# Patient Record
Sex: FEMALE | Race: WHITE | NOT HISPANIC OR LATINO | Employment: OTHER | URBAN - METROPOLITAN AREA
[De-identification: names, ages, dates, MRNs, and addresses within clinical notes are randomized per-mention and may not be internally consistent; named-entity substitution may affect disease eponyms.]

---

## 2017-01-06 DIAGNOSIS — M25.561 RIGHT KNEE PAIN, UNSPECIFIED CHRONICITY: Primary | ICD-10-CM

## 2017-01-09 ENCOUNTER — HOSPITAL ENCOUNTER (OUTPATIENT)
Dept: RADIOLOGY | Facility: HOSPITAL | Age: 37
Discharge: HOME | End: 2017-01-09
Attending: ORTHOPAEDIC SURGERY
Payer: MEDICAID

## 2017-01-09 ENCOUNTER — OFFICE VISIT (OUTPATIENT)
Dept: ORTHOPEDICS | Facility: CLINIC | Age: 37
End: 2017-01-09
Payer: MEDICAID

## 2017-01-09 VITALS — BODY MASS INDEX: 31.83 KG/M2 | HEIGHT: 68 IN | WEIGHT: 210 LBS

## 2017-01-09 DIAGNOSIS — M25.561 RIGHT KNEE PAIN, UNSPECIFIED CHRONICITY: ICD-10-CM

## 2017-01-09 DIAGNOSIS — M17.11 PRIMARY OSTEOARTHRITIS OF RIGHT KNEE: Primary | ICD-10-CM

## 2017-01-09 PROCEDURE — 99212 OFFICE O/P EST SF 10 MIN: CPT | Mod: PBBFAC,PO | Performed by: ORTHOPAEDIC SURGERY

## 2017-01-09 PROCEDURE — 73560 X-RAY EXAM OF KNEE 1 OR 2: CPT | Mod: 26,RT,, | Performed by: RADIOLOGY

## 2017-01-09 PROCEDURE — 99999 PR PBB SHADOW E&M-EST. PATIENT-LVL II: CPT | Mod: PBBFAC,,, | Performed by: ORTHOPAEDIC SURGERY

## 2017-01-09 PROCEDURE — 99203 OFFICE O/P NEW LOW 30 MIN: CPT | Mod: S$PBB,,, | Performed by: ORTHOPAEDIC SURGERY

## 2017-01-09 NOTE — PROGRESS NOTES
HISTORY OF PRESENT ILLNESS:  Lennie Amor is 36 years old, complaining of   pain in her right knee. A couple of weeks ago, on Jeannie day, she twisted her   knee, felt a pop, felt that her kneecap came out of place as she has had this   in the past, but has never remained this swollen and painful, rates the pain as   10/10 on the pain scale.    PHYSICAL EXAMINATION:  Today shows she does have swelling about the knee.    Positive patellar apprehension.  Skin is intact.  Compartments are soft.    Extensor mechanism appears to be functioning.    X-rays show well preserved joint spacing.    ASSESSMENT:  Patellar instability.    PLAN:  We will continue with knee immobilizer.  We will get an MRI of her knee.    We will see her back after that to discuss different treatment options.      ABUNDIO/NAVID  dd: 2017 09:48:14 (CST)  td: 2017 19:55:18 (CST)  Doc ID   #[id number]  Job ID #[id number]    CC:     Further History  Aching pain  Worse with activity  Relieved with rest  No other associated symptoms  No other radiation    Further Exam  Alert and oriented  Pleasant  Contralateral limb has appropriate range of motion for age and condition  Contralateral limb has appropriate strength for age and condition  Contralateral limb has appropriate stability  for age and condition  No adenopathy  Pulses are appropriate for current condition  Skin is intact        Chief Complaint    Chief Complaint   Patient presents with    Knee Pain     right knee x2wks       HPI  Lennie Amor is a 36 y.o.  female who presents with       Past Medical History  History reviewed. No pertinent past medical history.    Past Surgical History  Past Surgical History   Procedure Laterality Date    Knee arthroscopy       section         Medications  No current outpatient prescriptions on file.     No current facility-administered medications for this visit.        Allergies  Review of patient's allergies indicates:  No Known  Allergies    Family History  History reviewed. No pertinent family history.    Social History  Social History     Social History    Marital status:      Spouse name: N/A    Number of children: N/A    Years of education: N/A     Occupational History    Not on file.     Social History Main Topics    Smoking status: Not on file    Smokeless tobacco: Not on file    Alcohol use Not on file    Drug use: Not on file    Sexual activity: Not on file     Other Topics Concern    Not on file     Social History Narrative    No narrative on file               Review of Systems     Constitutional: Negative    HENT: Negative  Eyes: Negative  Respiratory: Negative  Cardiovascular: Negative  Musculoskeletal: HPI  Skin: Negative  Neurological: Negative  Hematological: Negative  Endocrine: Negative                 Physical Exam    There were no vitals filed for this visit.  Body mass index is 31.93 kg/(m^2).  Physical Examination:     General appearance -  well appearing, and in no distress  Mental status - awake  Neck - supple  Chest -  symmetric air entry  Heart - normal rate   Abdomen - soft      Assessment     1. Primary osteoarthritis of right knee    2. Right knee pain, unspecified chronicity          Plan

## 2017-01-16 ENCOUNTER — OFFICE VISIT (OUTPATIENT)
Dept: ORTHOPEDICS | Facility: CLINIC | Age: 37
End: 2017-01-16
Payer: MEDICAID

## 2017-01-16 VITALS — BODY MASS INDEX: 31.83 KG/M2 | HEIGHT: 68 IN | WEIGHT: 210 LBS

## 2017-01-16 DIAGNOSIS — M25.561 RIGHT KNEE PAIN, UNSPECIFIED CHRONICITY: ICD-10-CM

## 2017-01-16 DIAGNOSIS — M17.11 PRIMARY OSTEOARTHRITIS OF RIGHT KNEE: Primary | ICD-10-CM

## 2017-01-16 PROCEDURE — 99212 OFFICE O/P EST SF 10 MIN: CPT | Mod: PBBFAC,PO | Performed by: ORTHOPAEDIC SURGERY

## 2017-01-16 PROCEDURE — 99999 PR PBB SHADOW E&M-EST. PATIENT-LVL II: CPT | Mod: PBBFAC,,, | Performed by: ORTHOPAEDIC SURGERY

## 2017-01-16 PROCEDURE — 99213 OFFICE O/P EST LOW 20 MIN: CPT | Mod: S$PBB,,, | Performed by: ORTHOPAEDIC SURGERY

## 2017-01-16 NOTE — PROGRESS NOTES
HISTORY OF PRESENT ILLNESS:  Followup MRI of her knee showed changes consistent   with her history of patellar instability.  At this point, we are going to   transition her to a Shields type brace.  We will also get her set up with   physical therapy.  We can check her back in a few weeks' time to see how things   are coming along.      ABUNDIO/MARIA ALEJANDRA  dd: 01/16/2017 11:25:59 (CST)  td: 01/17/2017 03:11:31 (CST)  Doc ID   #[id number]  Job ID #[id number]    CC:

## 2017-08-03 RX ORDER — CYCLOBENZAPRINE HCL 10 MG
10 TABLET ORAL NIGHTLY
Qty: 30 TABLET | Refills: 0 | Status: SHIPPED | OUTPATIENT
Start: 2017-08-03 | End: 2017-08-31 | Stop reason: SDUPTHER

## 2017-08-03 RX ORDER — CYCLOBENZAPRINE HCL 10 MG
1 TABLET ORAL NIGHTLY
Refills: 2 | COMMUNITY
Start: 2017-07-01 | End: 2017-08-03 | Stop reason: SDUPTHER

## 2017-08-04 ENCOUNTER — TELEPHONE (OUTPATIENT)
Dept: FAMILY MEDICINE | Facility: CLINIC | Age: 37
End: 2017-08-04

## 2017-08-04 NOTE — TELEPHONE ENCOUNTER
Spoke with pt, notified her that echo was normal and will call back to schedule a follow up in 6 months.

## 2017-08-17 ENCOUNTER — TELEPHONE (OUTPATIENT)
Dept: PRIMARY CARE CLINIC | Facility: CLINIC | Age: 37
End: 2017-08-17

## 2017-08-23 RX ORDER — ATORVASTATIN CALCIUM 10 MG/1
TABLET, FILM COATED ORAL
Qty: 30 TABLET | Refills: 5 | Status: SHIPPED | OUTPATIENT
Start: 2017-08-23 | End: 2018-02-23

## 2017-08-23 RX ORDER — CLORAZEPATE DIPOTASSIUM 7.5 MG/1
TABLET ORAL
Qty: 30 TABLET | Refills: 0 | OUTPATIENT
Start: 2017-08-23

## 2017-09-01 RX ORDER — CYCLOBENZAPRINE HCL 10 MG
TABLET ORAL
Qty: 30 TABLET | Refills: 0 | Status: SHIPPED | OUTPATIENT
Start: 2017-09-01 | End: 2017-11-21 | Stop reason: SDUPTHER

## 2017-09-01 RX ORDER — DICLOFENAC SODIUM 75 MG/1
TABLET, DELAYED RELEASE ORAL
Qty: 60 TABLET | Refills: 0 | Status: SHIPPED | OUTPATIENT
Start: 2017-09-01 | End: 2017-11-21 | Stop reason: SDUPTHER

## 2017-09-11 ENCOUNTER — OFFICE VISIT (OUTPATIENT)
Dept: PRIMARY CARE CLINIC | Facility: CLINIC | Age: 37
End: 2017-09-11
Payer: MEDICAID

## 2017-09-11 VITALS
RESPIRATION RATE: 18 BRPM | BODY MASS INDEX: 40.75 KG/M2 | TEMPERATURE: 99 F | DIASTOLIC BLOOD PRESSURE: 72 MMHG | SYSTOLIC BLOOD PRESSURE: 127 MMHG | WEIGHT: 268 LBS | HEART RATE: 98 BPM | OXYGEN SATURATION: 98 %

## 2017-09-11 DIAGNOSIS — G25.81 RESTLESS LEG SYNDROME: ICD-10-CM

## 2017-09-11 DIAGNOSIS — M25.561 ACUTE PAIN OF RIGHT KNEE: ICD-10-CM

## 2017-09-11 DIAGNOSIS — R51.9 HEADACHE, UNSPECIFIED HEADACHE TYPE: ICD-10-CM

## 2017-09-11 DIAGNOSIS — B07.0 PLANTAR WART OF RIGHT FOOT: Primary | ICD-10-CM

## 2017-09-11 PROBLEM — M19.90 OSTEOARTHRITIS: Status: ACTIVE | Noted: 2017-09-11

## 2017-09-11 PROBLEM — E78.5 HYPERLIPIDEMIA: Status: ACTIVE | Noted: 2017-09-11

## 2017-09-11 PROBLEM — F31.9 BIPOLAR I DISORDER: Status: ACTIVE | Noted: 2017-09-11

## 2017-09-11 PROBLEM — E66.9 OBESITY (BMI 30-39.9): Status: ACTIVE | Noted: 2017-09-11

## 2017-09-11 PROBLEM — R07.9 CHEST PAIN: Status: ACTIVE | Noted: 2017-09-11

## 2017-09-11 PROBLEM — M19.90 ARTHRITIS: Status: ACTIVE | Noted: 2017-09-11

## 2017-09-11 PROCEDURE — 3008F BODY MASS INDEX DOCD: CPT | Mod: S$GLB,,, | Performed by: FAMILY MEDICINE

## 2017-09-11 PROCEDURE — 99203 OFFICE O/P NEW LOW 30 MIN: CPT | Mod: S$GLB,,, | Performed by: FAMILY MEDICINE

## 2017-09-11 RX ORDER — ATORVASTATIN CALCIUM 40 MG/1
1 TABLET, FILM COATED ORAL DAILY
Refills: 1 | COMMUNITY
Start: 2017-07-31 | End: 2022-12-06

## 2017-09-11 RX ORDER — ISOSORBIDE MONONITRATE 30 MG/1
1 TABLET, EXTENDED RELEASE ORAL DAILY
Refills: 0 | COMMUNITY
Start: 2017-07-31 | End: 2018-06-07 | Stop reason: ALTCHOICE

## 2017-09-11 RX ORDER — RISPERIDONE 0.5 MG/1
1 TABLET ORAL NIGHTLY
Refills: 0 | COMMUNITY
Start: 2017-07-27 | End: 2018-03-09

## 2017-09-11 RX ORDER — HYDROXYZINE PAMOATE 50 MG/1
1 CAPSULE ORAL NIGHTLY
Refills: 0 | COMMUNITY
Start: 2017-07-27 | End: 2018-02-23

## 2017-09-11 RX ORDER — ACETAMINOPHEN AND CODEINE PHOSPHATE 300; 30 MG/1; MG/1
1 TABLET ORAL
Qty: 30 TABLET | Refills: 0 | Status: SHIPPED | OUTPATIENT
Start: 2017-09-11 | End: 2017-09-11 | Stop reason: CLARIF

## 2017-09-11 RX ORDER — OXCARBAZEPINE 300 MG/1
1 TABLET, FILM COATED ORAL 2 TIMES DAILY
Refills: 0 | COMMUNITY
Start: 2017-07-27 | End: 2018-06-07 | Stop reason: ALTCHOICE

## 2017-09-11 RX ORDER — OMEPRAZOLE 40 MG/1
1 CAPSULE, DELAYED RELEASE ORAL DAILY
Refills: 5 | COMMUNITY
Start: 2017-07-01 | End: 2017-11-21 | Stop reason: SDUPTHER

## 2017-09-11 RX ORDER — HYDROXYZINE PAMOATE 25 MG/1
1 CAPSULE ORAL DAILY
Refills: 0 | COMMUNITY
Start: 2017-07-27 | End: 2018-02-23

## 2017-09-11 RX ORDER — DULOXETIN HYDROCHLORIDE 60 MG/1
1 CAPSULE, DELAYED RELEASE ORAL NIGHTLY
Refills: 0 | COMMUNITY
Start: 2017-07-27 | End: 2018-03-09

## 2017-09-11 RX ORDER — ACETAMINOPHEN AND CODEINE PHOSPHATE 300; 30 MG/1; MG/1
1 TABLET ORAL
Qty: 30 TABLET | Refills: 0 | Status: SHIPPED | OUTPATIENT
Start: 2017-09-11 | End: 2018-02-23

## 2017-09-11 NOTE — PATIENT INSTRUCTIONS
Understanding Restless Legs Syndrome    Are you ever annoyed by a creeping or itching feeling in your legs? Do you often feel an urge to move your legs while sitting or lying in bed? This can keep you from falling asleep at night. You may then feel tired during the day. If you have these problems, talk to your health care provider. He or she can suggest a treatment plan and help you find ways to sleep better.  Restless legs syndrome (RLS)  RLS is a creeping, crawly, or jumpy feeling in the legs with an urge to move them. Symptoms of RLS often occur during periods of inactivity, such as when you sit or lie down at night. This discomfort can keep you from falling asleep. RLS is more common in older people and tends to run in families. Overuse of caffeine or alcohol may make symptoms worse. Iron deficiency, diabetes, or kidney problems can contribute to RLS.  Periodic limb movement syndrome (PLMS)  PLMS is sudden, repetitive leg jerking during sleep. The person you sleep with is often the one who notices it. Your legs may jerk many times during the night. You and your partner may both have trouble sleeping and feel tired in the morning. PLMS shouldnt be confused with the normal leg or body twitching many people have when first falling asleep.  Treating these problems  If these problems are causing disrupted sleep and daytime symptoms, treatment may be needed. Possible treatments may include:  · Avoiding medications like antidepressants, antinausea medications, and antipsychotic medications.   · Prescribed medications.  · Lifestyle changes, such as controlling caffeine intake, alcohol, and smoking.  Date Last Reviewed: 7/18/2015  © 8527-6206 [employer]. [address] [place] [address], [place] [address]. All rights reserved. This information is not intended as a substitute for professional medical care. Always follow your healthcare professional's instructions.

## 2017-09-11 NOTE — PROGRESS NOTES
"Chief Complaint  Chief Complaint   Patient presents with    sore on bottom of right foot       HPI  Lennie Amor is a 37 y.o. female with medical diagnoses as listed within the medical history and problem list that presents for painful sore on right foot.  Patient is new to me but known to the the clinic. Describes a small sore to right foot appearing several weeks ago. Previously painless, but has become more painful over the past week as it has grown. Has tried bacitracin and steroid cream at home with no relief. Denies fevers or chills. Also complains of her "legs jumping all night long". States that it started three weeks ago, occurs every night, waking her at night. Complains of recurrent chest pain, located in upper left chest. Has previously been worked up for the pain. Does not occur with exertion. No radiation, approx 5/10. She has an appointment with Dr. Ventura, her cardiologist for further evaluation this afternoon. Denies shortness of breath, palpitations.       PAST MEDICAL HISTORY:  Past Medical History:   Diagnosis Date    Arthritis     Hyperlipidemia        PAST SURGICAL HISTORY:  Past Surgical History:   Procedure Laterality Date     SECTION       SECTION      x 3    KNEE ARTHROSCOPY         SOCIAL HISTORY:  Social History     Social History    Marital status:      Spouse name: N/A    Number of children: N/A    Years of education: N/A     Occupational History    Not on file.     Social History Main Topics    Smoking status: Current Every Day Smoker    Smokeless tobacco: Never Used    Alcohol use No    Drug use: No    Sexual activity: Yes     Other Topics Concern    Not on file     Social History Narrative    No narrative on file       FAMILY HISTORY:  Family History   Problem Relation Age of Onset    Family history unknown: Yes       ALLERGIES AND MEDICATIONS: updated and reviewed.  Review of patient's allergies indicates:  No Known Allergies  Current " Outpatient Prescriptions   Medication Sig Dispense Refill    atorvastatin (LIPITOR) 40 MG tablet Take 1 tablet by mouth once daily.  1    cyclobenzaprine (FLEXERIL) 10 MG tablet TAKE 1 TABLET(10 MG) BY MOUTH EVERY EVENING 30 tablet 0    diclofenac (VOLTAREN) 75 MG EC tablet TAKE 1 TABLET BY MOUTH TWICE DAILY WITH FOOD OR MILK 60 tablet 0    duloxetine (CYMBALTA) 60 MG capsule Take 1 capsule by mouth every evening.  0    hydrOXYzine pamoate (VISTARIL) 25 MG Cap Take 1 capsule by mouth once daily.  0    hydrOXYzine pamoate (VISTARIL) 50 MG Cap Take 1 capsule by mouth every evening.  0    isosorbide mononitrate (IMDUR) 30 MG 24 hr tablet Take 1 tablet by mouth once daily.  0    omeprazole (PRILOSEC) 40 MG capsule Take 1 capsule by mouth once daily.  5    oxcarbazepine (TRILEPTAL) 300 MG Tab Take 1 tablet by mouth 2 (two) times daily.  0    risperidone (RISPERDAL) 0.5 MG Tab Take 1 tablet by mouth every evening.  0    acetaminophen-codeine 300-30mg (TYLENOL #3) 300-30 mg Tab Take 1 tablet by mouth every 4 to 6 hours as needed. 30 tablet 0    atorvastatin (LIPITOR) 10 MG tablet TAKE 1 TABLET BY MOUTH EVERY DAY 30 tablet 5     No current facility-administered medications for this visit.          ROS  Review of Systems   Constitutional: Negative for chills, fatigue and fever.   HENT: Negative for congestion, rhinorrhea, sinus pressure and sore throat.    Respiratory: Negative for cough, chest tightness and shortness of breath.    Cardiovascular: Positive for chest pain. Negative for palpitations.   Gastrointestinal: Negative for blood in stool, diarrhea, nausea and vomiting.   Endocrine: Negative for cold intolerance and heat intolerance.   Genitourinary: Negative for dysuria, frequency, hematuria and urgency.   Musculoskeletal: Positive for arthralgias (right knee pain). Negative for joint swelling.   Skin: Positive for wound (sore to bottom right foot). Negative for rash.   Neurological: Positive for tremors.  Negative for dizziness and headaches.   Psychiatric/Behavioral: Negative for dysphoric mood, hallucinations and sleep disturbance. The patient is not nervous/anxious.            PHYSICAL EXAM  Vitals:    09/11/17 1308   BP: 127/72   BP Location: Right arm   Patient Position: Sitting   BP Method: Large (Automatic)   Pulse: 98   Resp: 18   Temp: 98.5 °F (36.9 °C)   TempSrc: Oral   SpO2: 98%   Weight: 121.6 kg (268 lb)    Body mass index is 40.75 kg/m².  Weight: 121.6 kg (268 lb)           Physical Exam   Constitutional: She is oriented to person, place, and time. She appears well-developed and well-nourished.   HENT:   Head: Normocephalic.   Mouth/Throat: Oropharynx is clear and moist and mucous membranes are normal.   Eyes: Conjunctivae are normal.   Cardiovascular: Normal rate, regular rhythm, normal heart sounds and normal pulses.    No murmur heard.  Pulses:       Radial pulses are 2+ on the right side, and 2+ on the left side.   Pulmonary/Chest: Effort normal and breath sounds normal. She has no wheezes.   Abdominal: Soft. Bowel sounds are normal. There is no tenderness.   Musculoskeletal: She exhibits no edema.   Lymphadenopathy:     She has no cervical adenopathy.   Neurological: She is alert and oriented to person, place, and time.   Skin: Skin is warm and dry. No rash noted.        Psychiatric: She has a normal mood and affect.         Health Maintenance       Date Due Completion Date    Lipid Panel 1980 ---    TETANUS VACCINE 05/03/1998 ---    Pneumococcal PPSV23 (Medium Risk) (1) 05/03/1998 ---    Pap Smear with HPV Cotest 05/03/2001 ---    Influenza Vaccine 08/01/2017 ---            Assessment & Plan    Lennie was seen today for sore on bottom of right foot.    Diagnoses and all orders for this visit:    Plantar wart of right foot  -     Ambulatory referral to Podiatry    Restless leg syndrome        - Instructed to speak with psychiatrist at next visit regarding appropriate treatment considering  psychiatric medications patient is taking    Headache, unspecified headache type       -  Tylenol #3 for pain as prescribed.        -     acetaminophen-codeine 300-30mg (TYLENOL #3) 300-30 mg Tab; Take 1 tablet by mouth every 4 to 6 hours as needed.    Acute pain of right knee       -     acetaminophen-codeine 300-30mg (TYLENOL #3) 300-30 mg Tab; Take 1 tablet by mouth every 4 to 6 hours as needed.          Follow-up: No Follow-up on file.

## 2017-11-13 RX ORDER — DICLOFENAC SODIUM 75 MG/1
TABLET, DELAYED RELEASE ORAL
Qty: 60 TABLET | Refills: 3 | Status: SHIPPED | OUTPATIENT
Start: 2017-11-13 | End: 2021-12-27

## 2017-11-18 RX ORDER — CYCLOBENZAPRINE HCL 10 MG
TABLET ORAL
Qty: 30 TABLET | Refills: 0 | Status: CANCELLED | OUTPATIENT
Start: 2017-11-18

## 2017-11-21 NOTE — TELEPHONE ENCOUNTER
----- Message from Kesha Cheema sent at 11/21/2017  9:50 AM CST -----  Contact: Patient  Lennie, patient [phone], Calling for refills on Rx    omeprazole (PRILOSEC) 40 MG capsule  cyclobenzaprine (FLEXERIL) 10 MG tablet 30 tablet   diclofenac (VOLTAREN) 75 MG EC tablet 60 tablet     Please advise. Thanks.      [facility] [address] [place] [address] [address] AT [address] [address]  Phone: [phone] Fax: [phone]

## 2017-11-25 RX ORDER — OMEPRAZOLE 40 MG/1
CAPSULE, DELAYED RELEASE ORAL
Qty: 30 CAPSULE | Refills: 2 | Status: SHIPPED | OUTPATIENT
Start: 2017-11-25 | End: 2018-06-07 | Stop reason: ALTCHOICE

## 2017-11-25 RX ORDER — DICLOFENAC SODIUM 75 MG/1
75 TABLET, DELAYED RELEASE ORAL 2 TIMES DAILY
Qty: 60 TABLET | Refills: 2 | Status: SHIPPED | OUTPATIENT
Start: 2017-11-25 | End: 2018-02-22 | Stop reason: SDUPTHER

## 2017-11-25 RX ORDER — CYCLOBENZAPRINE HCL 10 MG
10 TABLET ORAL NIGHTLY
Qty: 30 TABLET | Refills: 2 | Status: SHIPPED | OUTPATIENT
Start: 2017-11-25 | End: 2018-06-07 | Stop reason: ALTCHOICE

## 2018-02-23 RX ORDER — DICLOFENAC SODIUM 75 MG/1
TABLET, DELAYED RELEASE ORAL
Qty: 60 TABLET | Refills: 0 | Status: SHIPPED | OUTPATIENT
Start: 2018-02-23 | End: 2018-03-20 | Stop reason: SDUPTHER

## 2018-02-26 PROBLEM — R07.89 ACUTE CHEST WALL PAIN: Status: ACTIVE | Noted: 2018-02-26

## 2018-03-09 ENCOUNTER — OFFICE VISIT (OUTPATIENT)
Dept: PRIMARY CARE CLINIC | Facility: CLINIC | Age: 38
End: 2018-03-09
Payer: MEDICAID

## 2018-03-09 VITALS
TEMPERATURE: 98 F | WEIGHT: 265 LBS | RESPIRATION RATE: 18 BRPM | SYSTOLIC BLOOD PRESSURE: 101 MMHG | HEIGHT: 68 IN | DIASTOLIC BLOOD PRESSURE: 69 MMHG | HEART RATE: 68 BPM | OXYGEN SATURATION: 95 % | BODY MASS INDEX: 40.16 KG/M2

## 2018-03-09 DIAGNOSIS — R60.0 BILATERAL LEG EDEMA: Primary | ICD-10-CM

## 2018-03-09 DIAGNOSIS — R07.9 CHEST PAIN, UNSPECIFIED TYPE: ICD-10-CM

## 2018-03-09 DIAGNOSIS — R07.89 ACUTE CHEST WALL PAIN: ICD-10-CM

## 2018-03-09 DIAGNOSIS — M19.90 OSTEOARTHRITIS, UNSPECIFIED OSTEOARTHRITIS TYPE, UNSPECIFIED SITE: ICD-10-CM

## 2018-03-09 PROCEDURE — 99999 PR PBB SHADOW E&M-EST. PATIENT-LVL V: CPT | Mod: PBBFAC,,, | Performed by: NURSE PRACTITIONER

## 2018-03-09 PROCEDURE — 99214 OFFICE O/P EST MOD 30 MIN: CPT | Mod: S$PBB,,, | Performed by: NURSE PRACTITIONER

## 2018-03-09 PROCEDURE — 99215 OFFICE O/P EST HI 40 MIN: CPT | Mod: PBBFAC,PN | Performed by: NURSE PRACTITIONER

## 2018-03-09 NOTE — PROGRESS NOTES
Chief Complaint  Chief Complaint   Patient presents with    Leg Swelling     bilateral       HPI  Lennie Amor is a 37 y.o. female with multiple medical diagnoses as listed in the medical history and problem list that presents for bilateral lower extremity swelling.  Reports the onset of bilateral lower extremity swelling approximately 1 week ago.  Previously told by doctors to elevate legs and reduce salt intake.  Patient reports that she has done both with an improvement in her symptoms.  Swelling throughout the day.  Present upon waking in the morning.  Has been elevating legs and walking to her job without improvement.  Accompanying pain described as throbbing.  History of chest pain currently under the care of Dr. Carter.  Cardiac catheter in February.  Follow-up in 2 weeks to discuss results.  Currently on Imdur and reports occasional chest pain despite her compliance.  Denies chest pain at this time.  Reports bilateral knee pain and history of osteoarthritis previously treated with steroid injections without improvement.    PAST MEDICAL HISTORY:  Past Medical History:   Diagnosis Date    Abnormal EKG     Angina pectoris     Arthritis     Bipolar affective disorder     GERD (gastroesophageal reflux disease)     Hyperlipidemia     Hyperlipidemia     Migraine        PAST SURGICAL HISTORY:  Past Surgical History:   Procedure Laterality Date    CARDIAC CATHETERIZATION  2018     SECTION       SECTION      x 3    KNEE ARTHROSCOPY         SOCIAL HISTORY:  Social History     Social History    Marital status:      Spouse name: N/A    Number of children: N/A    Years of education: N/A     Occupational History    Not on file.     Social History Main Topics    Smoking status: Current Every Day Smoker     Packs/day: 0.50     Types: Cigarettes    Smokeless tobacco: Never Used    Alcohol use No    Drug use: No    Sexual activity: Yes     Other Topics Concern     Not on file     Social History Narrative    No narrative on file       FAMILY HISTORY:  Family History   Problem Relation Age of Onset    Family history unknown: Yes       ALLERGIES AND MEDICATIONS: updated and reviewed.  Review of patient's allergies indicates:  No Known Allergies  Current Outpatient Prescriptions   Medication Sig Dispense Refill    atorvastatin (LIPITOR) 40 MG tablet Take 1 tablet by mouth once daily.  1    clorazepate (TRANXENE) 7.5 MG Tab Take 3.75 mg by mouth once daily.      cyclobenzaprine (FLEXERIL) 10 MG tablet Take 1 tablet (10 mg total) by mouth every evening. 30 tablet 2    diclofenac (VOLTAREN) 75 MG EC tablet TAKE 1 TABLET(75 MG) BY MOUTH TWICE DAILY 60 tablet 0    isosorbide mononitrate (IMDUR) 30 MG 24 hr tablet Take 1 tablet by mouth once daily.  0    omeprazole (PRILOSEC) 40 MG capsule TAKE 1 CAPSULE BY MOUTH AT BEDTIME 30 capsule 2    oxcarbazepine (TRILEPTAL) 300 MG Tab Take 1 tablet by mouth 2 (two) times daily.  0     No current facility-administered medications for this visit.          ROS  Review of Systems   Constitutional: Negative for chills, fatigue and fever.   HENT: Negative for congestion, rhinorrhea, sinus pressure and sore throat.    Respiratory: Negative for cough, chest tightness and shortness of breath.    Cardiovascular: Positive for leg swelling. Negative for chest pain and palpitations.   Gastrointestinal: Negative for blood in stool, diarrhea, nausea and vomiting.   Genitourinary: Negative for dysuria, frequency, hematuria and urgency.   Musculoskeletal: Positive for arthralgias and joint swelling. Negative for back pain.   Skin: Negative for rash and wound.   Neurological: Negative for dizziness and headaches.   Psychiatric/Behavioral: Negative for dysphoric mood and sleep disturbance. The patient is not nervous/anxious.          PHYSICAL EXAM  Vitals:    03/09/18 1014   BP: 101/69   BP Location: Right arm   Patient Position: Sitting   BP Method:  "Large (Automatic)   Pulse: 68   Resp: 18   Temp: 98.2 °F (36.8 °C)   TempSrc: Oral   SpO2: 95%   Weight: 120.2 kg (265 lb)   Height: 5' 8" (1.727 m)    Body mass index is 40.29 kg/m².  Weight: 120.2 kg (265 lb)   Height: 5' 8" (172.7 cm)     Physical Exam   Constitutional: She is oriented to person, place, and time. She appears well-developed and well-nourished.   HENT:   Head: Normocephalic.   Right Ear: Tympanic membrane normal.   Left Ear: Tympanic membrane normal.   Mouth/Throat: Uvula is midline, oropharynx is clear and moist and mucous membranes are normal.   Eyes: Conjunctivae are normal.   Cardiovascular: Normal rate, regular rhythm and normal heart sounds.    No murmur heard.  Pulses:       Radial pulses are 2+ on the right side, and 2+ on the left side.        Posterior tibial pulses are 1+ on the right side, and 1+ on the left side.   +1 lower extremity swelling noted bilaterally.  No noted discoloration or erythema   Pulmonary/Chest: Effort normal and breath sounds normal. She has no wheezes.   Abdominal: Soft. Bowel sounds are normal. There is no tenderness.   Musculoskeletal: She exhibits no edema.   Lymphadenopathy:     She has no cervical adenopathy.   Neurological: She is alert and oriented to person, place, and time.   Skin: Skin is warm and dry. No rash noted.   Psychiatric: She has a normal mood and affect.         Health Maintenance       Date Due Completion Date    Lipid Panel 1980 ---    TETANUS VACCINE 05/03/1998 ---    Pneumococcal PPSV23 (Medium Risk) (1) 05/03/1998 ---    Pap Smear with HPV Cotest 05/03/2001 ---    Influenza Vaccine 08/01/2017 ---            Assessment & Plan    Lennie was seen today for leg swelling.    Diagnoses and all orders for this visit:    Bilateral leg edema  -     US Lower Extremity Veins Bilateral; Future  - Patient instructed to follow up with Dr. Carter as planned.  Upcoming appointment in 2 weeks.  At this time she can discuss the results of her " venous ultrasound    Acute chest wall pain        - Follow up with Dr. Carter as planned to discuss the recent results of her angiogram and stress test    Osteoarthritis, unspecified osteoarthritis type, unspecified site  The current medical regimen is effective;  continue present plan and medications.    Chest pain, unspecified type        Follow-up: Follow-up in about 2 weeks (around 3/23/2018).

## 2018-03-14 ENCOUNTER — TELEPHONE (OUTPATIENT)
Dept: PRIMARY CARE CLINIC | Facility: CLINIC | Age: 38
End: 2018-03-14

## 2018-03-14 NOTE — TELEPHONE ENCOUNTER
----- Message from Wilber Hanks sent at 3/14/2018 12:02 PM CDT -----  Contact: pt  Pt is returning call, call placed to pod no answer   Call Back#[phone]  Thanks

## 2018-03-14 NOTE — TELEPHONE ENCOUNTER
Spoke with pt, notified her that ultrasound was normal and to follow up with cardiology as planned. States her understanding.

## 2018-03-21 RX ORDER — DICLOFENAC SODIUM 75 MG/1
TABLET, DELAYED RELEASE ORAL
Qty: 60 TABLET | Refills: 5 | Status: SHIPPED | OUTPATIENT
Start: 2018-03-21 | End: 2018-06-07 | Stop reason: SDUPTHER

## 2018-06-07 ENCOUNTER — OFFICE VISIT (OUTPATIENT)
Dept: PRIMARY CARE CLINIC | Facility: CLINIC | Age: 38
End: 2018-06-07
Payer: MEDICAID

## 2018-06-07 VITALS
DIASTOLIC BLOOD PRESSURE: 67 MMHG | TEMPERATURE: 98 F | RESPIRATION RATE: 18 BRPM | BODY MASS INDEX: 40.79 KG/M2 | HEIGHT: 68 IN | WEIGHT: 269.13 LBS | SYSTOLIC BLOOD PRESSURE: 108 MMHG | OXYGEN SATURATION: 100 % | HEART RATE: 60 BPM

## 2018-06-07 DIAGNOSIS — K80.20 GALLSTONES: ICD-10-CM

## 2018-06-07 DIAGNOSIS — Z72.0 TOBACCO ABUSE: ICD-10-CM

## 2018-06-07 DIAGNOSIS — Z86.59 HISTORY OF BIPOLAR DISORDER: ICD-10-CM

## 2018-06-07 DIAGNOSIS — E78.5 HYPERLIPIDEMIA, UNSPECIFIED HYPERLIPIDEMIA TYPE: ICD-10-CM

## 2018-06-07 DIAGNOSIS — M17.11 OSTEOARTHRITIS OF RIGHT KNEE, UNSPECIFIED OSTEOARTHRITIS TYPE: ICD-10-CM

## 2018-06-07 DIAGNOSIS — G43.809 OTHER MIGRAINE WITHOUT STATUS MIGRAINOSUS, NOT INTRACTABLE: Primary | ICD-10-CM

## 2018-06-07 DIAGNOSIS — R10.13 ABDOMINAL PAIN, EPIGASTRIC: ICD-10-CM

## 2018-06-07 DIAGNOSIS — I25.10 CORONARY ARTERY DISEASE, ANGINA PRESENCE UNSPECIFIED, UNSPECIFIED VESSEL OR LESION TYPE, UNSPECIFIED WHETHER NATIVE OR TRANSPLANTED HEART: ICD-10-CM

## 2018-06-07 DIAGNOSIS — R20.2 NUMBNESS AND TINGLING IN LEFT ARM: ICD-10-CM

## 2018-06-07 DIAGNOSIS — R20.0 NUMBNESS AND TINGLING IN LEFT ARM: ICD-10-CM

## 2018-06-07 DIAGNOSIS — M25.473 ANKLE EDEMA: ICD-10-CM

## 2018-06-07 DIAGNOSIS — E66.9 OBESITY, UNSPECIFIED CLASSIFICATION, UNSPECIFIED OBESITY TYPE, UNSPECIFIED WHETHER SERIOUS COMORBIDITY PRESENT: ICD-10-CM

## 2018-06-07 PROBLEM — G43.909 MIGRAINE WITHOUT STATUS MIGRAINOSUS, NOT INTRACTABLE: Status: ACTIVE | Noted: 2018-06-07

## 2018-06-07 PROCEDURE — 99999 PR PBB SHADOW E&M-EST. PATIENT-LVL IV: CPT | Mod: PBBFAC,,, | Performed by: FAMILY MEDICINE

## 2018-06-07 PROCEDURE — 99214 OFFICE O/P EST MOD 30 MIN: CPT | Mod: PBBFAC,PN | Performed by: FAMILY MEDICINE

## 2018-06-07 PROCEDURE — 99213 OFFICE O/P EST LOW 20 MIN: CPT | Mod: S$PBB,,, | Performed by: FAMILY MEDICINE

## 2018-06-07 RX ORDER — CLORAZEPATE DIPOTASSIUM 7.5 MG/1
TABLET ORAL
Qty: 30 TABLET | Refills: 2 | Status: SHIPPED | OUTPATIENT
Start: 2018-06-07 | End: 2020-07-16

## 2018-06-07 RX ORDER — OMEPRAZOLE 40 MG/1
40 CAPSULE, DELAYED RELEASE ORAL DAILY
Qty: 30 CAPSULE | Refills: 5 | Status: SHIPPED | OUTPATIENT
Start: 2018-06-07 | End: 2019-04-02 | Stop reason: SDUPTHER

## 2018-06-07 RX ORDER — TRAMADOL HYDROCHLORIDE 50 MG/1
50 TABLET ORAL EVERY 12 HOURS PRN
Qty: 30 TABLET | Refills: 0 | Status: SHIPPED | OUTPATIENT
Start: 2018-06-07 | End: 2018-06-17

## 2018-06-07 RX ORDER — RANOLAZINE 500 MG/1
1 TABLET, FILM COATED, EXTENDED RELEASE ORAL 2 TIMES DAILY
Refills: 3 | COMMUNITY
Start: 2018-05-22

## 2018-06-07 RX ORDER — DICLOFENAC SODIUM 75 MG/1
75 TABLET, DELAYED RELEASE ORAL 2 TIMES DAILY
Qty: 60 TABLET | Refills: 5 | Status: SHIPPED | OUTPATIENT
Start: 2018-06-07 | End: 2019-08-06

## 2018-06-07 RX ORDER — METHYLPREDNISOLONE 4 MG/1
TABLET ORAL
Qty: 1 PACKAGE | Refills: 0 | Status: SHIPPED | OUTPATIENT
Start: 2018-06-07 | End: 2018-06-28

## 2018-06-07 NOTE — PROGRESS NOTES
Subjective:       Patient ID: Lennie Amor is a 38 y.o. female.    Chief Complaint: Arm Numbness (Left)    HPI: 38-year-old female in for left arm numbness--started 1-1/2 weeks ago--- from the elbow to the fingers especially sore in the olecranon process--all fingers hand and forearm to the elbow are numb.  Patient is a dispatcher.  On computer a lot--uses a wrist pain.  Has a wrist rest support for her computer.  No trauma, no excessive activity.  Has arthritis in the right knee history of lupus rheumatoid gout fractures       Stomach pain--epigastric area--every time he eats even soup gets abdominal pain, no history of GERD or reflux or hiatal hernia.  + Nausea, 2003 was told had a few gallstones no vomiting diarrhea constipation ulcers hepatitis melena hematochezia or hematemesis     ROS:  Skin: no psoriasis, eczema, skin cancer   HEENT:+ migaine  Headache--relieved with Tylenol No. 3 out nail, ocular pain, blurred vision, diplopia, epistaxis, hoarseness change in voice, thyroid trouble  Lung: No pneumonia, asthma, Tb, wheezing, SOB, + smoking half pack per day  Heart: No chest pain,+ ankle edema--patient sees Dr. Carter on Lasix 20 mg,no  palpitations, MI, peter murmur, hypertension, +hyperlipidemia + angina patient sees Dr. 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 lives with  and 3 children    Objective:   Physical Exam:  General: Well nourished, well developed, no acute distress + obese  Skin: No lesions  HEENT: Eyes PERRLA, EOM  intact, nose patent, throat non-erythematous ears TM clear  NECK: Supple, no bruits, No JVD, no nodes  Lungs: Clear, no rales, rhonchi, wheezing  Heart: Regular rate and rhythm, no murmurs, gallops, or rubs  Abdomen: flat, bowel sounds positive, no tenderness, or organomegaly mild tenderness in the epigastric area but minimal no rebound or guarding  MS: Right knee with a brace--pain with flexion and extension squatting and arise.  Left arm pain especially in the lateral epicondyles and the ulnar groove of the elbow, numbness the entire left hand and left forearm--palpation in the ulnar groove produces numbness in the fourth and fifth digits some wrist pressures produce pain or numbness in the thumb and second through fourth digits  Neuro: Alert, CN intact, oriented X 3  Extremities: No cyanosis, clubbing, or edema         Assessment:       1. Other migraine without status migrainosus, not intractable    2. Numbness and tingling in left arm    3. Abdominal pain, epigastric    4. Gallstones    5. Obesity, unspecified classification, unspecified obesity type, unspecified whether serious comorbidity present    6. Hyperlipidemia, unspecified hyperlipidemia type    7. Osteoarthritis of right knee, unspecified osteoarthritis type    8. History of bipolar disorder    9. Ankle edema    10. Coronary artery disease, angina presence unspecified, unspecified vessel or lesion type, unspecified whether native or transplanted heart        Plan:       Other migraine without status migrainosus, not intractable    Numbness and tingling in left arm    Abdominal pain, epigastric    Gallstones    Obesity, unspecified classification, unspecified obesity type, unspecified whether serious comorbidity present    Hyperlipidemia, unspecified hyperlipidemia type    Osteoarthritis of right knee, unspecified osteoarthritis type    History of bipolar disorder    Ankle edema    Coronary artery disease, angina presence unspecified, unspecified  vessel or lesion type, unspecified whether native or transplanted heart      main reason for office visit numbness and left arm probably secondary to carpal tunnel patient given referral to neurology for EMG study of the left forearm  Trial of prednisone--diclofenac--use omeprazole while on NSAIDs and tramadol for pain  Abdominal pain epigastric area history of gallstones referral to Dr. karimi to evaluate cholecystectomy will do abdominal ultrasound to evaluate gallstone  Patient to continue omeprazole if breakthrough use Zantac also--needs to DC smoking the morning cause of ulcers/alcohol/caffeine/stress/carbonated drinks--will probably benefit from gallbladder out  Patient advised needs to lose weight

## 2018-06-13 ENCOUNTER — TELEPHONE (OUTPATIENT)
Dept: PRIMARY CARE CLINIC | Facility: CLINIC | Age: 38
End: 2018-06-13

## 2018-06-13 NOTE — TELEPHONE ENCOUNTER
Spoke with patient states pharmacy needs us to call them so she can fill her tramadol. Spoke with kristian gomes pt needs a PA for her tramadol. Notified her I will do one today. Called and notified pt. States understanding

## 2018-06-13 NOTE — TELEPHONE ENCOUNTER
----- Message from Vj Odonnell sent at 6/13/2018 11:02 AM CDT -----  Contact: patient  Type: Needs Medical Advice    Who Called:  patient  Symptoms (please be specific):    How long has patient had these symptoms:    Pharmacy name and phone #:    Best Call Back Number: [phone]  Additional Information: requesting a call back regarding pain medication

## 2018-06-19 ENCOUNTER — TELEPHONE (OUTPATIENT)
Dept: PRIMARY CARE CLINIC | Facility: CLINIC | Age: 38
End: 2018-06-19

## 2018-06-19 NOTE — TELEPHONE ENCOUNTER
----- Message from Trishfish Limon sent at 6/19/2018  2:26 PM CDT -----  Prior authorization on Rx Tramadol.  Please send into [facility] [address].  Please call when called in at [phone].

## 2018-06-21 ENCOUNTER — TELEPHONE (OUTPATIENT)
Dept: PRIMARY CARE CLINIC | Facility: CLINIC | Age: 38
End: 2018-06-21

## 2018-06-21 NOTE — TELEPHONE ENCOUNTER
----- Message from Kesha Cheema sent at 6/21/2018 11:18 AM CDT -----  Contact: Patient  Type:  Patient Returning Call    Who Called:  maritza Hogue  Who Left Message for Patient:  Soha  Does the patient know what this is regarding?:  Prior authorization  Best Call Back Number:  [phone]  Additional Information:  Missed your call yesterday, please call her back. Thanks.

## 2018-06-21 NOTE — TELEPHONE ENCOUNTER
Spoke with patient notified her, her PA for the tramadol has been sent off to the insurance company. Now were just waiting for an approval or denial. States her understanding

## 2018-06-27 NOTE — TELEPHONE ENCOUNTER
Never received determination from [employer]. iniated a new request through cover my meds.

## 2018-06-27 NOTE — TELEPHONE ENCOUNTER
Spoke with patient notified her per cover my meds, patients insurance denied the request for the tramadol. States she will have to call back and make another appoinment since tylenol is not helping the pain. No further issues discussed.

## 2019-04-02 RX ORDER — OMEPRAZOLE 40 MG/1
CAPSULE, DELAYED RELEASE ORAL
Qty: 30 CAPSULE | Refills: 0 | Status: SHIPPED | OUTPATIENT
Start: 2019-04-02 | End: 2020-07-16

## 2019-07-12 RX ORDER — DICLOFENAC SODIUM 75 MG/1
TABLET, DELAYED RELEASE ORAL
Qty: 60 TABLET | Refills: 0 | OUTPATIENT
Start: 2019-07-12

## 2019-07-12 NOTE — TELEPHONE ENCOUNTER
Please call patient and inform her that her prescription will not be able to be refill that she has not been seen by Dr. Montes in over a year.  Please as patient to schedule an appointment with Dr. MORGAN

## 2019-11-04 ENCOUNTER — PATIENT OUTREACH (OUTPATIENT)
Dept: ADMINISTRATIVE | Facility: HOSPITAL | Age: 39
End: 2019-11-04

## 2020-05-14 DIAGNOSIS — Z12.39 BREAST CANCER SCREENING: ICD-10-CM

## 2020-07-16 ENCOUNTER — OFFICE VISIT (OUTPATIENT)
Dept: PRIMARY CARE CLINIC | Facility: CLINIC | Age: 40
End: 2020-07-16
Payer: MEDICAID

## 2020-07-16 VITALS
HEIGHT: 68 IN | WEIGHT: 254.06 LBS | SYSTOLIC BLOOD PRESSURE: 100 MMHG | OXYGEN SATURATION: 98 % | TEMPERATURE: 98 F | HEART RATE: 56 BPM | BODY MASS INDEX: 38.5 KG/M2 | RESPIRATION RATE: 17 BRPM | DIASTOLIC BLOOD PRESSURE: 60 MMHG

## 2020-07-16 DIAGNOSIS — E66.9 OBESITY, UNSPECIFIED CLASSIFICATION, UNSPECIFIED OBESITY TYPE, UNSPECIFIED WHETHER SERIOUS COMORBIDITY PRESENT: ICD-10-CM

## 2020-07-16 DIAGNOSIS — G43.809 OTHER MIGRAINE WITHOUT STATUS MIGRAINOSUS, NOT INTRACTABLE: ICD-10-CM

## 2020-07-16 DIAGNOSIS — Z11.4 ENCOUNTER FOR SCREENING FOR HIV: ICD-10-CM

## 2020-07-16 DIAGNOSIS — R51.9 NONINTRACTABLE HEADACHE, UNSPECIFIED CHRONICITY PATTERN, UNSPECIFIED HEADACHE TYPE: Primary | ICD-10-CM

## 2020-07-16 DIAGNOSIS — Z72.0 TOBACCO ABUSE: ICD-10-CM

## 2020-07-16 DIAGNOSIS — K80.20 GALLSTONES: ICD-10-CM

## 2020-07-16 DIAGNOSIS — F31.9 BIPOLAR I DISORDER: ICD-10-CM

## 2020-07-16 DIAGNOSIS — H02.60 XANTHELASMA: ICD-10-CM

## 2020-07-16 DIAGNOSIS — M19.90 OSTEOARTHRITIS, UNSPECIFIED OSTEOARTHRITIS TYPE, UNSPECIFIED SITE: ICD-10-CM

## 2020-07-16 DIAGNOSIS — R07.9 CHEST PAIN, UNSPECIFIED TYPE: ICD-10-CM

## 2020-07-16 DIAGNOSIS — E78.5 HYPERLIPIDEMIA, UNSPECIFIED HYPERLIPIDEMIA TYPE: ICD-10-CM

## 2020-07-16 DIAGNOSIS — M25.473 ANKLE EDEMA: ICD-10-CM

## 2020-07-16 PROCEDURE — 99214 OFFICE O/P EST MOD 30 MIN: CPT | Mod: S$PBB,,, | Performed by: FAMILY MEDICINE

## 2020-07-16 PROCEDURE — 99214 PR OFFICE/OUTPT VISIT, EST, LEVL IV, 30-39 MIN: ICD-10-PCS | Mod: S$PBB,,, | Performed by: FAMILY MEDICINE

## 2020-07-16 PROCEDURE — 99999 PR PBB SHADOW E&M-EST. PATIENT-LVL V: CPT | Mod: PBBFAC,,, | Performed by: FAMILY MEDICINE

## 2020-07-16 PROCEDURE — 99215 OFFICE O/P EST HI 40 MIN: CPT | Mod: PBBFAC,PN | Performed by: FAMILY MEDICINE

## 2020-07-16 PROCEDURE — 99999 PR PBB SHADOW E&M-EST. PATIENT-LVL V: ICD-10-PCS | Mod: PBBFAC,,, | Performed by: FAMILY MEDICINE

## 2020-07-16 RX ORDER — BUTALBITAL, ACETAMINOPHEN AND CAFFEINE 50; 325; 40 MG/1; MG/1; MG/1
TABLET ORAL
Qty: 30 TABLET | Refills: 2 | Status: SHIPPED | OUTPATIENT
Start: 2020-07-16 | End: 2020-07-16

## 2020-07-16 RX ORDER — BUTALBITAL, ACETAMINOPHEN AND CAFFEINE 50; 325; 40 MG/1; MG/1; MG/1
TABLET ORAL
Qty: 30 TABLET | Refills: 2 | Status: SHIPPED | OUTPATIENT
Start: 2020-07-16 | End: 2021-06-08 | Stop reason: SDUPTHER

## 2020-07-16 NOTE — PROGRESS NOTES
Subjective:       Patient ID: Lennie Amor is a 40 y.o. female.    Chief Complaint: Headache    HPI:  40-year-old female in for checkup and headache      Headaches---location usually in the right temporal area are occipital area--quality throbbing like hip with a hammer---severity 10/10--frequency q.a.m. and q.p.m.--duration usually take 4 Motrin in a.m. on awakening in 2:30 p.m. take 3 more---no nausea vomiting dizziness passing out or seizures.  Headache started 2-2 and half weeks ago.  No fever runny nose sore throat cough.   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       Also needs to checkup last checkup1-1 1/2 yrs ago     ROS:  Skin: no psoriasis, eczema, skin cancer  HEENT: + headache see history of present illness,no  ocular pain, blurred vision, diplopia, epistaxis, hoarseness change in voice, thyroid trouble  Lung: No pneumonia, asthma, Tb, wheezing, SOB, smokes half pack per day  Heart: +chest pain  On ranexa from Dr. Carter told had angina--with medications not very often but with out medication gets almost all day--had angiogram--no stent no bypass, + ankle edema, palpitations, MI, peter murmur, hypertension,+hyperlipidemia--on Lipitor no stent bypass arrhythmia  Abdomen: No nausea, vomiting, diarrhea, constipation, ulcers, hepatitis, gallbladder disease, melena, hematochezia, hematemesis  : no UTI, renal disease, stones  GYN LMP 07/02/2020--last PAP 2 yrs ago, mammogram had one  Scheduled  but hospital cancel  MS: no fractures, O/A, lupus, rheumatoid, gout  Neuro: No dizziness, LOC, seizures   No diabetes, no anemia, no anxiety, + depression states has not had to take any medication   3 children 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 with  3 children and 1 grandchild     Objective:   Physical Exam:  General: Well nourished, well developed, no acute distress +obesity  Skin:  Xanthelasma  HEENT: Eyes  PERRLA, EOM intact, nose patent, throat non-erythematous   NECK: Supple, no bruits, No JVD, no nodes  Lungs: Clear, no rales, rhonchi, wheezing  Heart: Regular rate and rhythm, no murmurs, gallops, or rubs  Abdomen: flat, bowel sounds positive, no tenderness, or organomegaly  MS: Range of motion and muscle strength intact has brace on right knee able squat arise without difficulty able to bend flex  Neuro: Alert, CN intact, oriented X 3 Romberg negative heel-toe intact  Extremities: No cyanosis, clubbing, or edema         Assessment:       1. Nonintractable headache, unspecified chronicity pattern, unspecified headache type    2. Other migraine without status migrainosus, not intractable    3. Bipolar I disorder    4. Hyperlipidemia, unspecified hyperlipidemia type    5. Obesity, unspecified classification, unspecified obesity type, unspecified whether serious comorbidity present    6. Osteoarthritis, unspecified osteoarthritis type, unspecified site    7. Gallstones    8. Ankle edema    9. Chest pain, unspecified type    10. Tobacco abuse    11. Xanthelasma        Plan:       Nonintractable headache, unspecified chronicity pattern, unspecified headache type    Other migraine without status migrainosus, not intractable    Bipolar I disorder    Hyperlipidemia, unspecified hyperlipidemia type    Obesity, unspecified classification, unspecified obesity type, unspecified whether serious comorbidity present    Osteoarthritis, unspecified osteoarthritis type, unspecified site    Gallstones    Ankle edema    Chest pain, unspecified type    Tobacco abuse    Xanthelasma        Headache--temporal in occipital area--daily x2 weeks--severity 10/10--like getting hit in head with a hammer----Fioricet 1 p.o. b.i.d. p.r.n. headache/MRI of the brain with and without gadolinium/ophthalmology consult---needs to do headache diary severity location duration quality  Sees Dr. Carter cardiology--ankle edema/atypical chest pain on  Ranexa  Tobacco abuse patient Needs to DC smoking if desires could tried Nicoderm ago through the smoking cessation program at the hospital  History depression and bipolar patient states not on medication for that trying hand Dayana herself  Xanthelasma hyperlipidemia needs to be on low-fat diet/exercise as tolerated/try to get to ideal body weight continue Lipitor 40 q.p.m.  History of GERD/osteoarthritis right knee  But lab CBCs CMP lipids T4 TSH stool guaiac UA chest x-ray EKG is physical  Health maintenance HIV tetanus pneumococcal vaccine mammogram

## 2020-07-23 ENCOUNTER — TELEPHONE (OUTPATIENT)
Dept: PRIMARY CARE CLINIC | Facility: CLINIC | Age: 40
End: 2020-07-23

## 2020-07-23 DIAGNOSIS — R94.6 ABNORMAL THYROID EXAM: Primary | ICD-10-CM

## 2020-07-23 NOTE — TELEPHONE ENCOUNTER
----- Message from Destiny Solomon sent at 7/23/2020 12:24 PM CDT -----  Regarding: Pt self Mobile/Home [phone]  Patient would like to get a referral.  Referral to what specialty:  Endocrinology  Does the patient want the referral with a specific physician:  N/A  Is the specialist an [facility]:  N/A  Reason (be specific):  Patient was diagnosed with hyperthyroidism  Does the patient already have the specialty clinic appointment scheduled: N/A   If yes, what date is the appointment scheduled:   N/A  Is the insurance listed in Epic correct? (this is important for a referral):  Yes

## 2020-07-24 NOTE — TELEPHONE ENCOUNTER
----- Message from Destinyjordan Solomon sent at 7/24/2020  2:06 PM CDT -----  Regarding: Pt sef Mobile/Home [phone]  Patient is calling in regards to her saying that she was diagnosed with hyperthyroidism and she would like to speak with you about her being put on a medication right away before she goes to see a Endocrinologist please.     [id number] [place] [place] [facility] [address] AT [facility] [address]  [phone] Fax# [phone]       Comment: Patient said that she's really having bad hot flashes.

## 2020-07-27 ENCOUNTER — OFFICE VISIT (OUTPATIENT)
Dept: PRIMARY CARE CLINIC | Facility: CLINIC | Age: 40
End: 2020-07-27
Payer: MEDICAID

## 2020-07-27 DIAGNOSIS — E05.90 HYPERTHYROIDISM: ICD-10-CM

## 2020-07-27 DIAGNOSIS — R94.6 ABNORMAL THYROID FUNCTION TEST: Primary | ICD-10-CM

## 2020-07-27 DIAGNOSIS — E01.0 THYROMEGALY: ICD-10-CM

## 2020-07-27 PROCEDURE — 99213 PR OFFICE/OUTPT VISIT, EST, LEVL III, 20-29 MIN: ICD-10-PCS | Mod: 95,,, | Performed by: INTERNAL MEDICINE

## 2020-07-27 PROCEDURE — 99213 OFFICE O/P EST LOW 20 MIN: CPT | Mod: 95,,, | Performed by: INTERNAL MEDICINE

## 2020-07-28 RX ORDER — PREDNISONE 20 MG/1
TABLET ORAL
Qty: 15 TABLET | Refills: 0 | Status: SHIPPED | OUTPATIENT
Start: 2020-07-28 | End: 2021-06-08

## 2020-07-28 RX ORDER — METHIMAZOLE 10 MG/1
10 TABLET ORAL 2 TIMES DAILY
Qty: 60 TABLET | Refills: 5 | Status: SHIPPED | OUTPATIENT
Start: 2020-07-28 | End: 2021-06-08

## 2020-07-28 NOTE — PROGRESS NOTES
Subjective:    The patient location is: home  The chief complaint leading to consultation is: abnormal thyroid tests    Visit type: audiovisual    Face to Face time with patient: 15 minutes   minutes of total time spent on the encounter, which includes face to face time and non-face to face time preparing to see the patient (eg, review of tests), Obtaining and/or reviewing separately obtained history, Documenting clinical information in the electronic or other health record, Independently interpreting results (not separately reported) and communicating results to the patient/family/caregiver, or Care coordination (not separately reported).         Each patient to whom he or she provides medical services by telemedicine is:  (1) informed of the relationship between the physician and patient and the respective role of any other health care provider with respect to management of the patient; and (2) notified that he or she may decline to receive medical services by telemedicine and may withdraw from such care at any time.    Notes:    Patient ID: Lennie Amor is a 40 y.o. female.  Pt was seen by telemedicine physical exam and vital signs are limitted  Chief Complaint: No chief complaint on file.    HPI  Pt visit today for f/u labs done by Dr Wakefield her cardiologist told f/u with PCP has abnormal TFTs lab results currently not available pt report symtoms wt loss tremor diarrhea feeling warm  And anxiety and dysphagia she denies sob cp RODRIGUEZ or palpitation  Review of Systems    Objective:      Physical Exam  Constitutional:       General: She is not in acute distress.     Appearance: She is obese.   Eyes:      Extraocular Movements: Extraocular movements intact.   Neck:      Comments: ?thyromegaly  Pulmonary:      Effort: Pulmonary effort is normal.   Neurological:      Mental Status: She is alert and oriented to person, place, and time.   Psychiatric:         Mood and Affect: Mood normal.         Thought Content:  Thought content normal.         Judgment: Judgment normal.         Assessment:       1. Abnormal thyroid function test    2. Hyperthyroidism    3. Thyromegaly        Plan:       Abnormal thyroid function test  Comments:  pt ha symptoms of hyperactive thyroid need get lab results from Dr Carter office before tx and will consult endocrinology  Orders:  -     Ambulatory referral/consult to Endocrinology; Future; Expected date: 08/04/2020    Hyperthyroidism  Comments:  r/o grave ds thyroiditis  Orders:  -     methIMAzole (TAPAZOLE) 10 MG Tab; Take 1 tablet (10 mg total) by mouth 2 (two) times daily.  Dispense: 60 tablet; Refill: 5  -     predniSONE (DELTASONE) 20 MG tablet; 1 po BID x 4 days then 1 po qd x 7 days  Dispense: 15 tablet; Refill: 0  -     Ambulatory referral/consult to Endocrinology; Future; Expected date: 08/04/2020    Thyromegaly  Comments:  will get thyroid u/s   Orders:  -     US Thyroid; Future; Expected date: 07/28/2020  -     Ambulatory referral/consult to Endocrinology; Future; Expected date: 08/04/2020

## 2020-10-05 ENCOUNTER — PATIENT MESSAGE (OUTPATIENT)
Dept: ADMINISTRATIVE | Facility: HOSPITAL | Age: 40
End: 2020-10-05

## 2020-10-07 RX ORDER — BUTALBITAL, ACETAMINOPHEN AND CAFFEINE 50; 325; 40 MG/1; MG/1; MG/1
TABLET ORAL
Qty: 30 TABLET | Refills: 2 | OUTPATIENT
Start: 2020-10-07

## 2020-10-07 NOTE — TELEPHONE ENCOUNTER
Call tell patient control medications not refilled over the phone Fioricet is contrast heard a control medication if desires refills can do a virtual visit

## 2021-01-04 ENCOUNTER — PATIENT MESSAGE (OUTPATIENT)
Dept: ADMINISTRATIVE | Facility: HOSPITAL | Age: 41
End: 2021-01-04

## 2021-01-12 LAB
HUMAN PAPILLOMAVIRUS (HPV): NORMAL
PAP SMEAR: NORMAL

## 2021-04-05 ENCOUNTER — PATIENT MESSAGE (OUTPATIENT)
Dept: ADMINISTRATIVE | Facility: HOSPITAL | Age: 41
End: 2021-04-05

## 2021-04-16 ENCOUNTER — PATIENT MESSAGE (OUTPATIENT)
Dept: RESEARCH | Facility: HOSPITAL | Age: 41
End: 2021-04-16

## 2021-06-08 ENCOUNTER — OFFICE VISIT (OUTPATIENT)
Dept: PRIMARY CARE CLINIC | Facility: CLINIC | Age: 41
End: 2021-06-08
Payer: MEDICAID

## 2021-06-08 VITALS
TEMPERATURE: 98 F | HEIGHT: 68 IN | HEART RATE: 71 BPM | SYSTOLIC BLOOD PRESSURE: 102 MMHG | RESPIRATION RATE: 18 BRPM | DIASTOLIC BLOOD PRESSURE: 64 MMHG | BODY MASS INDEX: 39.61 KG/M2 | OXYGEN SATURATION: 97 % | WEIGHT: 261.38 LBS

## 2021-06-08 DIAGNOSIS — G44.209 TENSION HEADACHE: Primary | ICD-10-CM

## 2021-06-08 PROCEDURE — 99214 OFFICE O/P EST MOD 30 MIN: CPT | Mod: S$PBB,,, | Performed by: STUDENT IN AN ORGANIZED HEALTH CARE EDUCATION/TRAINING PROGRAM

## 2021-06-08 PROCEDURE — 99214 PR OFFICE/OUTPT VISIT, EST, LEVL IV, 30-39 MIN: ICD-10-PCS | Mod: S$PBB,,, | Performed by: STUDENT IN AN ORGANIZED HEALTH CARE EDUCATION/TRAINING PROGRAM

## 2021-06-08 PROCEDURE — 99214 OFFICE O/P EST MOD 30 MIN: CPT | Mod: PBBFAC,PN | Performed by: STUDENT IN AN ORGANIZED HEALTH CARE EDUCATION/TRAINING PROGRAM

## 2021-06-08 PROCEDURE — 99999 PR PBB SHADOW E&M-EST. PATIENT-LVL IV: CPT | Mod: PBBFAC,,, | Performed by: STUDENT IN AN ORGANIZED HEALTH CARE EDUCATION/TRAINING PROGRAM

## 2021-06-08 PROCEDURE — 99999 PR PBB SHADOW E&M-EST. PATIENT-LVL IV: ICD-10-PCS | Mod: PBBFAC,,, | Performed by: STUDENT IN AN ORGANIZED HEALTH CARE EDUCATION/TRAINING PROGRAM

## 2021-06-08 RX ORDER — BUTALBITAL, ACETAMINOPHEN AND CAFFEINE 50; 325; 40 MG/1; MG/1; MG/1
TABLET ORAL
Qty: 30 TABLET | Refills: 1 | Status: SHIPPED | OUTPATIENT
Start: 2021-06-08 | End: 2022-05-04

## 2021-06-08 RX ORDER — METHIMAZOLE 5 MG/1
3 TABLET ORAL DAILY
COMMUNITY
Start: 2021-04-30 | End: 2022-12-06

## 2021-06-08 RX ORDER — FUROSEMIDE 20 MG/1
20 TABLET ORAL DAILY PRN
COMMUNITY
Start: 2021-01-22 | End: 2021-11-09 | Stop reason: SDUPTHER

## 2021-07-06 ENCOUNTER — PATIENT MESSAGE (OUTPATIENT)
Dept: ADMINISTRATIVE | Facility: HOSPITAL | Age: 41
End: 2021-07-06

## 2021-10-15 ENCOUNTER — TELEPHONE (OUTPATIENT)
Dept: PRIMARY CARE CLINIC | Facility: CLINIC | Age: 41
End: 2021-10-15

## 2021-10-19 ENCOUNTER — TELEPHONE (OUTPATIENT)
Dept: PRIMARY CARE CLINIC | Facility: CLINIC | Age: 41
End: 2021-10-19

## 2021-10-21 ENCOUNTER — OFFICE VISIT (OUTPATIENT)
Dept: PRIMARY CARE CLINIC | Facility: CLINIC | Age: 41
End: 2021-10-21
Payer: MEDICAID

## 2021-10-21 DIAGNOSIS — Z11.59 NEED FOR HEPATITIS C SCREENING TEST: ICD-10-CM

## 2021-10-21 DIAGNOSIS — Z12.31 SCREENING MAMMOGRAM, ENCOUNTER FOR: ICD-10-CM

## 2021-10-21 DIAGNOSIS — R51.9 NONINTRACTABLE HEADACHE, UNSPECIFIED CHRONICITY PATTERN, UNSPECIFIED HEADACHE TYPE: ICD-10-CM

## 2021-10-21 DIAGNOSIS — E78.5 HYPERLIPIDEMIA, UNSPECIFIED HYPERLIPIDEMIA TYPE: ICD-10-CM

## 2021-10-21 DIAGNOSIS — F31.9 BIPOLAR I DISORDER: ICD-10-CM

## 2021-10-21 DIAGNOSIS — M77.10 LATERAL EPICONDYLITIS, UNSPECIFIED LATERALITY: Primary | ICD-10-CM

## 2021-10-21 DIAGNOSIS — M77.11 LATERAL EPICONDYLITIS OF RIGHT ELBOW: ICD-10-CM

## 2021-10-21 DIAGNOSIS — Z11.4 ENCOUNTER FOR SCREENING FOR HIV: ICD-10-CM

## 2021-10-21 DIAGNOSIS — E66.9 OBESITY (BMI 30-39.9): ICD-10-CM

## 2021-10-21 DIAGNOSIS — Z72.0 TOBACCO ABUSE: ICD-10-CM

## 2021-10-21 PROCEDURE — 99213 PR OFFICE/OUTPT VISIT, EST, LEVL III, 20-29 MIN: ICD-10-PCS | Mod: 95,,, | Performed by: FAMILY MEDICINE

## 2021-10-21 PROCEDURE — 99213 OFFICE O/P EST LOW 20 MIN: CPT | Mod: 95,,, | Performed by: FAMILY MEDICINE

## 2021-10-21 RX ORDER — DICLOFENAC SODIUM 75 MG/1
TABLET, DELAYED RELEASE ORAL
Qty: 30 TABLET | Refills: 2 | Status: SHIPPED | OUTPATIENT
Start: 2021-10-21 | End: 2021-12-18

## 2021-10-21 RX ORDER — METHYLPREDNISOLONE 4 MG/1
TABLET ORAL
Qty: 21 EACH | Refills: 0 | Status: SHIPPED | OUTPATIENT
Start: 2021-10-21 | End: 2021-11-11

## 2021-11-08 ENCOUNTER — TELEPHONE (OUTPATIENT)
Dept: PRIMARY CARE CLINIC | Facility: CLINIC | Age: 41
End: 2021-11-08
Payer: MEDICAID

## 2021-11-09 ENCOUNTER — OFFICE VISIT (OUTPATIENT)
Dept: PRIMARY CARE CLINIC | Facility: CLINIC | Age: 41
End: 2021-11-09
Payer: MEDICAID

## 2021-11-09 VITALS
HEIGHT: 68 IN | WEIGHT: 257.94 LBS | OXYGEN SATURATION: 98 % | BODY MASS INDEX: 39.09 KG/M2 | SYSTOLIC BLOOD PRESSURE: 128 MMHG | RESPIRATION RATE: 18 BRPM | HEART RATE: 85 BPM | DIASTOLIC BLOOD PRESSURE: 70 MMHG

## 2021-11-09 DIAGNOSIS — F31.9 BIPOLAR I DISORDER: ICD-10-CM

## 2021-11-09 DIAGNOSIS — K80.20 GALLSTONES: ICD-10-CM

## 2021-11-09 DIAGNOSIS — M25.473 ANKLE EDEMA: ICD-10-CM

## 2021-11-09 DIAGNOSIS — E66.9 OBESITY (BMI 30-39.9): ICD-10-CM

## 2021-11-09 DIAGNOSIS — M77.10 LATERAL EPICONDYLITIS, UNSPECIFIED LATERALITY: Primary | ICD-10-CM

## 2021-11-09 DIAGNOSIS — Z72.0 TOBACCO ABUSE: ICD-10-CM

## 2021-11-09 DIAGNOSIS — G43.809 OTHER MIGRAINE WITHOUT STATUS MIGRAINOSUS, NOT INTRACTABLE: ICD-10-CM

## 2021-11-09 PROCEDURE — 99999 PR PBB SHADOW E&M-EST. PATIENT-LVL IV: ICD-10-PCS | Mod: PBBFAC,,, | Performed by: FAMILY MEDICINE

## 2021-11-09 PROCEDURE — 99214 PR OFFICE/OUTPT VISIT, EST, LEVL IV, 30-39 MIN: ICD-10-PCS | Mod: S$PBB,,, | Performed by: FAMILY MEDICINE

## 2021-11-09 PROCEDURE — 99214 OFFICE O/P EST MOD 30 MIN: CPT | Mod: PBBFAC,PN | Performed by: FAMILY MEDICINE

## 2021-11-09 PROCEDURE — 90472 IMMUNIZATION ADMIN EACH ADD: CPT | Mod: PBBFAC,PN

## 2021-11-09 PROCEDURE — 99214 OFFICE O/P EST MOD 30 MIN: CPT | Mod: S$PBB,,, | Performed by: FAMILY MEDICINE

## 2021-11-09 PROCEDURE — 99999 PR PBB SHADOW E&M-EST. PATIENT-LVL IV: CPT | Mod: PBBFAC,,, | Performed by: FAMILY MEDICINE

## 2021-11-09 PROCEDURE — 90471 IMMUNIZATION ADMIN: CPT | Mod: PBBFAC,PN

## 2021-11-09 RX ORDER — SUMATRIPTAN SUCCINATE 100 MG/1
TABLET ORAL
COMMUNITY
Start: 2021-09-23 | End: 2024-01-28

## 2021-11-09 RX ORDER — TOPIRAMATE 50 MG/1
50 TABLET, FILM COATED ORAL NIGHTLY
COMMUNITY
Start: 2021-10-14 | End: 2024-01-28

## 2021-11-09 RX ORDER — TRAMADOL HYDROCHLORIDE 50 MG/1
50 TABLET ORAL EVERY 6 HOURS PRN
Qty: 30 TABLET | Refills: 0 | Status: SHIPPED | OUTPATIENT
Start: 2021-11-09 | End: 2021-11-19

## 2021-11-09 RX ORDER — FUROSEMIDE 20 MG/1
20 TABLET ORAL DAILY PRN
Qty: 30 TABLET | Refills: 5 | Status: SHIPPED | OUTPATIENT
Start: 2021-11-09 | End: 2022-07-01

## 2021-12-18 RX ORDER — DICLOFENAC SODIUM 75 MG/1
TABLET, DELAYED RELEASE ORAL
Qty: 30 TABLET | Refills: 5 | Status: SHIPPED | OUTPATIENT
Start: 2021-12-18 | End: 2021-12-27

## 2021-12-21 ENCOUNTER — OFFICE VISIT (OUTPATIENT)
Dept: PRIMARY CARE CLINIC | Facility: CLINIC | Age: 41
End: 2021-12-21
Payer: MEDICAID

## 2021-12-21 VITALS
WEIGHT: 254.06 LBS | OXYGEN SATURATION: 98 % | RESPIRATION RATE: 18 BRPM | HEART RATE: 102 BPM | SYSTOLIC BLOOD PRESSURE: 114 MMHG | DIASTOLIC BLOOD PRESSURE: 76 MMHG | BODY MASS INDEX: 38.5 KG/M2 | HEIGHT: 68 IN

## 2021-12-21 DIAGNOSIS — M19.90 OSTEOARTHRITIS, UNSPECIFIED OSTEOARTHRITIS TYPE, UNSPECIFIED SITE: ICD-10-CM

## 2021-12-21 DIAGNOSIS — G43.809 OTHER MIGRAINE WITHOUT STATUS MIGRAINOSUS, NOT INTRACTABLE: ICD-10-CM

## 2021-12-21 DIAGNOSIS — F31.9 BIPOLAR I DISORDER: ICD-10-CM

## 2021-12-21 DIAGNOSIS — H02.60 XANTHELASMA: ICD-10-CM

## 2021-12-21 DIAGNOSIS — E78.5 HYPERLIPIDEMIA, UNSPECIFIED HYPERLIPIDEMIA TYPE: ICD-10-CM

## 2021-12-21 DIAGNOSIS — M77.10 LATERAL EPICONDYLITIS, UNSPECIFIED LATERALITY: Primary | ICD-10-CM

## 2021-12-21 DIAGNOSIS — E66.9 OBESITY (BMI 30-39.9): ICD-10-CM

## 2021-12-21 DIAGNOSIS — R51.9 NONINTRACTABLE HEADACHE, UNSPECIFIED CHRONICITY PATTERN, UNSPECIFIED HEADACHE TYPE: ICD-10-CM

## 2021-12-21 DIAGNOSIS — Z72.0 TOBACCO ABUSE: ICD-10-CM

## 2021-12-21 DIAGNOSIS — M25.521 RIGHT ELBOW PAIN: ICD-10-CM

## 2021-12-21 PROCEDURE — 1159F MED LIST DOCD IN RCRD: CPT | Mod: CPTII,,, | Performed by: FAMILY MEDICINE

## 2021-12-21 PROCEDURE — 1159F PR MEDICATION LIST DOCUMENTED IN MEDICAL RECORD: ICD-10-PCS | Mod: CPTII,,, | Performed by: FAMILY MEDICINE

## 2021-12-21 PROCEDURE — 99213 OFFICE O/P EST LOW 20 MIN: CPT | Mod: S$PBB,,, | Performed by: FAMILY MEDICINE

## 2021-12-21 PROCEDURE — 3008F BODY MASS INDEX DOCD: CPT | Mod: CPTII,,, | Performed by: FAMILY MEDICINE

## 2021-12-21 PROCEDURE — 99214 OFFICE O/P EST MOD 30 MIN: CPT | Mod: PBBFAC,PN | Performed by: FAMILY MEDICINE

## 2021-12-21 PROCEDURE — 99213 PR OFFICE/OUTPT VISIT, EST, LEVL III, 20-29 MIN: ICD-10-PCS | Mod: S$PBB,,, | Performed by: FAMILY MEDICINE

## 2021-12-21 PROCEDURE — 99999 PR PBB SHADOW E&M-EST. PATIENT-LVL IV: CPT | Mod: PBBFAC,,, | Performed by: FAMILY MEDICINE

## 2021-12-21 PROCEDURE — 3008F PR BODY MASS INDEX (BMI) DOCUMENTED: ICD-10-PCS | Mod: CPTII,,, | Performed by: FAMILY MEDICINE

## 2021-12-21 PROCEDURE — 99999 PR PBB SHADOW E&M-EST. PATIENT-LVL IV: ICD-10-PCS | Mod: PBBFAC,,, | Performed by: FAMILY MEDICINE

## 2021-12-23 ENCOUNTER — TELEPHONE (OUTPATIENT)
Dept: PRIMARY CARE CLINIC | Facility: CLINIC | Age: 41
End: 2021-12-23
Payer: MEDICAID

## 2021-12-27 RX ORDER — CYCLOBENZAPRINE HCL 10 MG
10 TABLET ORAL 3 TIMES DAILY PRN
Qty: 30 TABLET | Refills: 5 | Status: SHIPPED | OUTPATIENT
Start: 2021-12-27 | End: 2022-01-06

## 2021-12-27 RX ORDER — TRAMADOL HYDROCHLORIDE 50 MG/1
50 TABLET ORAL EVERY 6 HOURS PRN
Qty: 30 TABLET | Refills: 0 | Status: SHIPPED | OUTPATIENT
Start: 2021-12-27 | End: 2022-02-03

## 2021-12-27 RX ORDER — MELOXICAM 7.5 MG/1
7.5 TABLET ORAL DAILY
Qty: 60 TABLET | Refills: 5 | Status: SHIPPED | OUTPATIENT
Start: 2021-12-27 | End: 2022-02-14 | Stop reason: ALTCHOICE

## 2022-01-21 ENCOUNTER — PATIENT MESSAGE (OUTPATIENT)
Dept: ADMINISTRATIVE | Facility: HOSPITAL | Age: 42
End: 2022-01-21
Payer: MEDICAID

## 2022-01-27 ENCOUNTER — TELEPHONE (OUTPATIENT)
Dept: PRIMARY CARE CLINIC | Facility: CLINIC | Age: 42
End: 2022-01-27
Payer: MEDICAID

## 2022-01-27 NOTE — TELEPHONE ENCOUNTER
I spoke with the patient and let her know that the Tramadol has to be refilled at an appt. She's already scheduled this upcoming Thursday and can discuss it then

## 2022-01-27 NOTE — TELEPHONE ENCOUNTER
----- Message from Yee Jolly sent at 1/27/2022  4:30 PM CST -----  Contact: [phone]  Pt is calling for her prescrition for tramadol and I do not see this in her chart.. please advise and give a return call

## 2022-01-28 ENCOUNTER — PROCEDURE VISIT (OUTPATIENT)
Dept: NEUROLOGY | Facility: CLINIC | Age: 42
End: 2022-01-28
Payer: MEDICAID

## 2022-01-28 DIAGNOSIS — M77.10 LATERAL EPICONDYLITIS, UNSPECIFIED LATERALITY: ICD-10-CM

## 2022-01-28 PROCEDURE — 95886 PR EMG COMPLETE, W/ NERVE CONDUCTION STUDIES, 5+ MUSCLES: ICD-10-PCS | Mod: 26,S$PBB,, | Performed by: PSYCHIATRY & NEUROLOGY

## 2022-01-28 PROCEDURE — 95886 MUSC TEST DONE W/N TEST COMP: CPT | Mod: PBBFAC | Performed by: PSYCHIATRY & NEUROLOGY

## 2022-01-28 PROCEDURE — 95913 NRV CNDJ TEST 13/> STUDIES: CPT | Mod: 26,S$PBB,, | Performed by: PSYCHIATRY & NEUROLOGY

## 2022-01-28 PROCEDURE — 95886 MUSC TEST DONE W/N TEST COMP: CPT | Mod: 26,S$PBB,, | Performed by: PSYCHIATRY & NEUROLOGY

## 2022-01-28 PROCEDURE — 95913 NRV CNDJ TEST 13/> STUDIES: CPT | Mod: PBBFAC | Performed by: PSYCHIATRY & NEUROLOGY

## 2022-01-28 PROCEDURE — 95913 PR NERVE CONDUCTION STUDY; 13 OR MORE STUDIES: ICD-10-PCS | Mod: 26,S$PBB,, | Performed by: PSYCHIATRY & NEUROLOGY

## 2022-02-01 ENCOUNTER — PATIENT OUTREACH (OUTPATIENT)
Dept: ADMINISTRATIVE | Facility: OTHER | Age: 42
End: 2022-02-01
Payer: MEDICAID

## 2022-02-02 ENCOUNTER — PATIENT OUTREACH (OUTPATIENT)
Dept: ADMINISTRATIVE | Facility: HOSPITAL | Age: 42
End: 2022-02-02
Payer: MEDICAID

## 2022-02-03 ENCOUNTER — OFFICE VISIT (OUTPATIENT)
Dept: PRIMARY CARE CLINIC | Facility: CLINIC | Age: 42
End: 2022-02-03
Payer: MEDICAID

## 2022-02-03 VITALS
HEIGHT: 68 IN | WEIGHT: 249.56 LBS | OXYGEN SATURATION: 99 % | HEART RATE: 77 BPM | RESPIRATION RATE: 18 BRPM | BODY MASS INDEX: 37.82 KG/M2 | DIASTOLIC BLOOD PRESSURE: 78 MMHG | SYSTOLIC BLOOD PRESSURE: 116 MMHG

## 2022-02-03 DIAGNOSIS — M25.521 RIGHT ELBOW PAIN: Primary | ICD-10-CM

## 2022-02-03 DIAGNOSIS — Z72.0 TOBACCO ABUSE: ICD-10-CM

## 2022-02-03 DIAGNOSIS — G43.809 OTHER MIGRAINE WITHOUT STATUS MIGRAINOSUS, NOT INTRACTABLE: ICD-10-CM

## 2022-02-03 DIAGNOSIS — E78.5 HYPERLIPIDEMIA, UNSPECIFIED HYPERLIPIDEMIA TYPE: ICD-10-CM

## 2022-02-03 DIAGNOSIS — F31.9 BIPOLAR I DISORDER: ICD-10-CM

## 2022-02-03 DIAGNOSIS — M77.10 LATERAL EPICONDYLITIS, UNSPECIFIED LATERALITY: ICD-10-CM

## 2022-02-03 DIAGNOSIS — H02.60 XANTHELASMA: ICD-10-CM

## 2022-02-03 DIAGNOSIS — E66.9 OBESITY (BMI 30-39.9): ICD-10-CM

## 2022-02-03 PROCEDURE — 3074F SYST BP LT 130 MM HG: CPT | Mod: CPTII,,, | Performed by: FAMILY MEDICINE

## 2022-02-03 PROCEDURE — 99999 PR PBB SHADOW E&M-EST. PATIENT-LVL III: CPT | Mod: PBBFAC,,, | Performed by: FAMILY MEDICINE

## 2022-02-03 PROCEDURE — 99213 OFFICE O/P EST LOW 20 MIN: CPT | Mod: PBBFAC,PN | Performed by: FAMILY MEDICINE

## 2022-02-03 PROCEDURE — 99214 OFFICE O/P EST MOD 30 MIN: CPT | Mod: S$PBB,,, | Performed by: FAMILY MEDICINE

## 2022-02-03 PROCEDURE — 3074F PR MOST RECENT SYSTOLIC BLOOD PRESSURE < 130 MM HG: ICD-10-PCS | Mod: CPTII,,, | Performed by: FAMILY MEDICINE

## 2022-02-03 PROCEDURE — 3008F BODY MASS INDEX DOCD: CPT | Mod: CPTII,,, | Performed by: FAMILY MEDICINE

## 2022-02-03 PROCEDURE — 1159F MED LIST DOCD IN RCRD: CPT | Mod: CPTII,,, | Performed by: FAMILY MEDICINE

## 2022-02-03 PROCEDURE — 1159F PR MEDICATION LIST DOCUMENTED IN MEDICAL RECORD: ICD-10-PCS | Mod: CPTII,,, | Performed by: FAMILY MEDICINE

## 2022-02-03 PROCEDURE — 99214 PR OFFICE/OUTPT VISIT, EST, LEVL IV, 30-39 MIN: ICD-10-PCS | Mod: S$PBB,,, | Performed by: FAMILY MEDICINE

## 2022-02-03 PROCEDURE — 3078F DIAST BP <80 MM HG: CPT | Mod: CPTII,,, | Performed by: FAMILY MEDICINE

## 2022-02-03 PROCEDURE — 3078F PR MOST RECENT DIASTOLIC BLOOD PRESSURE < 80 MM HG: ICD-10-PCS | Mod: CPTII,,, | Performed by: FAMILY MEDICINE

## 2022-02-03 PROCEDURE — 99999 PR PBB SHADOW E&M-EST. PATIENT-LVL III: ICD-10-PCS | Mod: PBBFAC,,, | Performed by: FAMILY MEDICINE

## 2022-02-03 PROCEDURE — 3008F PR BODY MASS INDEX (BMI) DOCUMENTED: ICD-10-PCS | Mod: CPTII,,, | Performed by: FAMILY MEDICINE

## 2022-02-03 RX ORDER — TRAMADOL HYDROCHLORIDE 50 MG/1
50 TABLET ORAL EVERY 6 HOURS PRN
Qty: 30 TABLET | Refills: 0 | Status: SHIPPED | OUTPATIENT
Start: 2022-02-03 | End: 2022-02-13

## 2022-02-03 NOTE — PROGRESS NOTES
Subjective:       Patient ID: Lennie Amor is a 41 y.o. female.    Chief Complaint: 6 Week Follow Up     HPI:  41-year-old female in for 6 week follow-up--patient lifted an empty box injured right elbow just superior to the olecranon process and inferior to olecranon process similar to an Ace leave being pulled up over the elbow.  EMG done which was normal showing no ulnar nerve or median nerve pathology. Hurts all day on---wakes up in the morning and arms stiff.  Patient was seen by neurologist told patient to wait see with the orthopedist says.  Orthopedic appoint was scheduled for today but was canceled because  Had an emergency--rescheduled 02/14/2022  Tried to  a drink with right hand drop did.  Able to flex to about 90 degree able to supinate pronate.     Patient on tramadol Mobic no muscle relaxer--using Epson salt to soak      ROS:    Skin: no psoriasis, eczema, skin cancer  HEENT:  +history migraine headache--good since patient is on medication,no  ocular pain, blurred vision, diplopia, epistaxis, hoarseness change in voice, +hypothyroid  Lung: No pneumonia, asthma, Tb, wheezing, SOB, +smoking half pack per day  Heart: No chest pain, +ankle edemaon lasix , no palpitations, MI, peter murmur, hypertension,+ hyperlipidemia--no stent bypass arrhythmia  Abdomen: No nausea, vomiting, diarrhea, constipation, ulcers, hepatitis, gallbladder disease, melena, hematochezia, hematemesis +cholelithiasis  : no UTI, renal disease, stones   GYN LMP 10/21/2021 Mammogram schedule in nFeb   MS: no fractures, O/A, lupus, rheumatoid, gout history of osteoarthritis history of right knee pain history of lateral epicondylitis see history of present illness  Neuro: No dizziness, LOC, seizures   No diabetes, no anemia, no anxiety, no depression    3 children work  lives with  into the chart    Objective:   Physical Exam:  General: Well nourished, well developed, no acute distress  Skin:  No lesions  HEENT: Eyes PERRLA, EOM intact, nose patent, throat non-erythematous   NECK: Supple, no bruits, No JVD, no nodes  Lungs: Clear, no rales, rhonchi, wheezing  Heart: Regular rate and rhythm, no murmurs, gallops, or rubs  Abdomen: flat, bowel sounds positive, no tenderness, or organomegaly  MS:  Pain approximately 2 cm above the right elbow circumferential in 2 cm below the elbow tenderness in the right lateral epicondyle area and ulnar groove in the area of the ulnar nerve tender with palpation pain with flexion about 90° decreased hand grasps good opposition thumb index thumb 5th digit good flexion extension forearm  Neuro: Alert, CN intact, oriented X 3  Extremities: No cyanosis, clubbing, or edema         Assessment:       1. Right elbow pain    2. Lateral epicondylitis, unspecified laterality    3. Tobacco abuse    4. Obesity (BMI 30-39.9)    5. Hyperlipidemia, unspecified hyperlipidemia type    6. Xanthelasma    7. Bipolar I disorder    8. Other migraine without status migrainosus, not intractable        Plan:       Right elbow pain    Lateral epicondylitis, unspecified laterality    Tobacco abuse    Obesity (BMI 30-39.9)    Hyperlipidemia, unspecified hyperlipidemia type    Xanthelasma    Bipolar I disorder    Other migraine without status migrainosus, not intractable    Other orders  -     traMADoL (ULTRAM) 50 mg tablet; Take 1 tablet (50 mg total) by mouth every 6 (six) hours as needed for Pain.  Dispense: 30 tablet; Refill: 0        Orthopedic consult --Right arm pain--lateral epicondylitis--with possible carpal tunnel--weakness with hand grasps---some pain with supination pronation of the forearm flexion extension forearms---EMG right arm see othropedist inject right lateral epicondyle --due dropping things may need surgery ulnar nerve or carpel tunnel or both--due to increased pain in the lateral epicondyle with palpation--decrease extension of the forearm to 150°--decreased supination to about  45°--see orthopedist--may need MRI elbow   History migraine headaches on Fioricet  Hypothyroidism patient on Tapazole  Tobacco abuse Needs to DC smoking  Ankle edema on Lasix 20  History cholelithiasis  History bipolar  Hyperlipidemia on Lipitor  Appt orhto Dr Martin ---EMG right arm --pt is medicaid but may need surgery --may be good case for forearm   Lab patient needs to do lab as in computer dated October  Health maintenance hepatitis C HIV

## 2022-02-10 ENCOUNTER — TELEPHONE (OUTPATIENT)
Dept: ORTHOPEDICS | Facility: CLINIC | Age: 42
End: 2022-02-10
Payer: MEDICAID

## 2022-02-14 ENCOUNTER — OFFICE VISIT (OUTPATIENT)
Dept: ORTHOPEDICS | Facility: CLINIC | Age: 42
End: 2022-02-14
Payer: MEDICAID

## 2022-02-14 VITALS
HEIGHT: 68 IN | HEART RATE: 68 BPM | DIASTOLIC BLOOD PRESSURE: 72 MMHG | SYSTOLIC BLOOD PRESSURE: 107 MMHG | BODY MASS INDEX: 37.69 KG/M2 | WEIGHT: 248.69 LBS

## 2022-02-14 DIAGNOSIS — R20.0 FINGER NUMBNESS: ICD-10-CM

## 2022-02-14 DIAGNOSIS — M25.521 RIGHT ELBOW PAIN: Primary | ICD-10-CM

## 2022-02-14 DIAGNOSIS — M77.11 RIGHT LATERAL EPICONDYLITIS: ICD-10-CM

## 2022-02-14 PROCEDURE — 99213 OFFICE O/P EST LOW 20 MIN: CPT | Mod: S$PBB,,, | Performed by: PHYSICIAN ASSISTANT

## 2022-02-14 PROCEDURE — 3008F BODY MASS INDEX DOCD: CPT | Mod: CPTII,,, | Performed by: PHYSICIAN ASSISTANT

## 2022-02-14 PROCEDURE — 3078F PR MOST RECENT DIASTOLIC BLOOD PRESSURE < 80 MM HG: ICD-10-PCS | Mod: CPTII,,, | Performed by: PHYSICIAN ASSISTANT

## 2022-02-14 PROCEDURE — 3008F PR BODY MASS INDEX (BMI) DOCUMENTED: ICD-10-PCS | Mod: CPTII,,, | Performed by: PHYSICIAN ASSISTANT

## 2022-02-14 PROCEDURE — 99999 PR PBB SHADOW E&M-EST. PATIENT-LVL IV: CPT | Mod: PBBFAC,,, | Performed by: PHYSICIAN ASSISTANT

## 2022-02-14 PROCEDURE — 1160F PR REVIEW ALL MEDS BY PRESCRIBER/CLIN PHARMACIST DOCUMENTED: ICD-10-PCS | Mod: CPTII,,, | Performed by: PHYSICIAN ASSISTANT

## 2022-02-14 PROCEDURE — 1160F RVW MEDS BY RX/DR IN RCRD: CPT | Mod: CPTII,,, | Performed by: PHYSICIAN ASSISTANT

## 2022-02-14 PROCEDURE — 3074F PR MOST RECENT SYSTOLIC BLOOD PRESSURE < 130 MM HG: ICD-10-PCS | Mod: CPTII,,, | Performed by: PHYSICIAN ASSISTANT

## 2022-02-14 PROCEDURE — 3078F DIAST BP <80 MM HG: CPT | Mod: CPTII,,, | Performed by: PHYSICIAN ASSISTANT

## 2022-02-14 PROCEDURE — 3074F SYST BP LT 130 MM HG: CPT | Mod: CPTII,,, | Performed by: PHYSICIAN ASSISTANT

## 2022-02-14 PROCEDURE — 99999 PR PBB SHADOW E&M-EST. PATIENT-LVL IV: ICD-10-PCS | Mod: PBBFAC,,, | Performed by: PHYSICIAN ASSISTANT

## 2022-02-14 PROCEDURE — 99213 PR OFFICE/OUTPT VISIT, EST, LEVL III, 20-29 MIN: ICD-10-PCS | Mod: S$PBB,,, | Performed by: PHYSICIAN ASSISTANT

## 2022-02-14 PROCEDURE — 1159F PR MEDICATION LIST DOCUMENTED IN MEDICAL RECORD: ICD-10-PCS | Mod: CPTII,,, | Performed by: PHYSICIAN ASSISTANT

## 2022-02-14 PROCEDURE — 1159F MED LIST DOCD IN RCRD: CPT | Mod: CPTII,,, | Performed by: PHYSICIAN ASSISTANT

## 2022-02-14 PROCEDURE — 99214 OFFICE O/P EST MOD 30 MIN: CPT | Mod: PBBFAC,PN | Performed by: PHYSICIAN ASSISTANT

## 2022-02-14 RX ORDER — DICLOFENAC SODIUM 10 MG/G
2 GEL TOPICAL 4 TIMES DAILY
Qty: 50 G | Refills: 1 | Status: SHIPPED | OUTPATIENT
Start: 2022-02-14 | End: 2022-11-05

## 2022-02-14 NOTE — PROGRESS NOTES
Subjective:      Patient ID: Lennie Amor is a 41 y.o. female.    Chief Complaint: Pain of the Right Elbow    Review of patient's allergies indicates:  No Known Allergies     42 yo RHD F presents to clinic with c/o right elbow pain x few weeks.  Sharp pain to lateral elbow, worse with movement and lifting things and improves with rest.  Also has occasional numbness/tingling to ring and small finger.  Diagnosed with lateral epicondylitis by PCP.  Had EMG recently which was normal.  She tried OTC elbow strap with little relief.  She has noticed swelling to elbow at times.      Review of Systems   Constitutional: Negative for chills, diaphoresis and fever.   HENT: Negative for congestion, ear discharge and ear pain.    Eyes: Negative for blurred vision, discharge, double vision and pain.   Cardiovascular: Negative for chest pain, claudication and cyanosis.   Respiratory: Negative for cough, hemoptysis and shortness of breath.    Endocrine: Negative for cold intolerance and heat intolerance.   Skin: Negative for color change, dry skin, itching and rash.   Musculoskeletal: Positive for joint pain and joint swelling. Negative for arthritis, back pain, falls, gout, muscle weakness and neck pain.   Gastrointestinal: Negative for abdominal pain and change in bowel habit.   Neurological: Negative for brief paralysis, disturbances in coordination, dizziness, numbness and paresthesias.   Psychiatric/Behavioral: Negative for altered mental status and depression.         Objective:          General    Constitutional: She is oriented to person, place, and time. She appears well-developed and well-nourished. No distress.   HENT:   Head: Atraumatic.   Eyes: EOM are normal. Right eye exhibits no discharge. Left eye exhibits no discharge.   Cardiovascular: Normal rate.    Pulmonary/Chest: Effort normal. No respiratory distress.   Abdominal: Soft.   Neurological: She is alert and oriented to person, place, and time.    Psychiatric: She has a normal mood and affect. Her behavior is normal.             Right Hand/Wrist Exam     Inspection   Scars: Wrist - absent   Effusion: Wrist - absent   Bruising: Wrist - absent   Deformity: Wrist - deformity     Range of Motion     Wrist   Extension: normal   Flexion: normal   Abduction: normal  Adduction: normal    Tests   Phalens Sign: negative  Tinel's sign (median nerve): negative  Finkelstein's test: negative  Carpal Tunnel Compression Test: negative  Cubital Tunnel Compression Test: positive      Other     Neuorologic Exam    Median Distribution: normal  Ulnar Distribution: normal  Radial Distribution: normal      Left Hand/Wrist Exam     Inspection   Scars: Wrist - absent   Effusion: Wrist - absent   Bruising: Wrist - absent   Deformity: Wrist - absent     Range of Motion     Wrist   Extension: normal   Flexion: normal   Abduction: normal  Adduction: normal    Tests   Phalens Sign: negative  Tinel's sign (median nerve): negative  Finkelstein's test: negative  Carpal Tunnel Compression Test: negative  Cubital Tunnel Compression Test: negative      Other     Sensory Exam  Median Distribution: normal  Ulnar Distribution: normal  Radial Distribution: normal      Right Elbow Exam     Inspection   Scars: absent  Effusion: absent  Bruising: absent  Deformity: absent  Atrophy: absent    Range of Motion   Extension: normal   Flexion: normal   Pronation: normal   Supination: normal     Tests   Tinel's sign (cubital tunnel): negative  Tennis Elbow: moderate  Golfer's Elbow: negative  Radial Capitellar Grind: negative    Other   Sensation: normal      Left Elbow Exam     Inspection   Scars: absent  Effusion: absent  Bruising: absent  Deformity: absent  Atrophy: absent    Range of Motion   Extension: normal   Flexion: normal   Pronation: normal   Supination: normal     Tests   Tinel's sign (cubital tunnel): negative  Tennis Elbow: negative  Golfer's Elbow: negative  Radial Capitellar Grind:  negative    Other   Sensation: normal        Muscle Strength   Right Upper Extremity   Wrist extension: 5/5   : 5/5   Elbow Pronation:  4/5   Elbow Supination:  4/5   Elbow Extension: 5/5  Elbow Flexion: 5/5  Left Upper Extremity  Wrist extension: 5/5   Wrist flexion: 5/5   :  5/5   Elbow Pronation:  5/5   Elbow Supination:  5/5   Elbow Extension: 5/5  Elbow Flexion: 5/5    Vascular Exam     Right Pulses      Radial:                    2+      Left Pulses      Radial:                    2+      Capillary Refill  Right Hand: normal capillary refill  Left Hand: normal capillary refill    Edema  Right Forearm: absent  Left Forearm: absent              Assessment:         EMG Bilateral Upper Extremities 1/28/22:  This is a normal study.    Xray Right Elbow 9/30/21:  No acute appearing abnormalities or focal osseous lesions.      Encounter Diagnosis   Name Primary?    Right elbow pain Yes    Right elbow pain  -     Ambulatory referral/consult to Orthopedics  -     Ambulatory referral/consult to Physical/Occupational Therapy; Future; Expected date: 02/21/2022  -     diclofenac sodium (VOLTAREN) 1 % Gel; Apply 2 g topically 4 (four) times daily. As needed.  Dispense: 50 g; Refill: 1               Plan:         Discussed diagnosis and treatment options with patient.  She would like to proceed with topical antiinflammatory and occupational therapy at this time. She declines referral to upper extremity specialist.      1. Diclofenac topically as prescribed as needed.  2. Ambulatory referral to physical therapy for elbow strengthening and tennis elbow protocol.  3. Ice compress to the affected area 2-3x a day for 15-20 minutes as needed for pain management.  4. RTC in 6 weeks for follow-up, sooner if sx worsen or fail to improve or any new s/s.    Patient voices understanding of and agreement with treatment plan. All of the patient's questions were answered and the patient will contact us if she has any questions or  concerns in the interim.

## 2022-03-14 DIAGNOSIS — Z12.31 OTHER SCREENING MAMMOGRAM: ICD-10-CM

## 2022-03-22 NOTE — TELEPHONE ENCOUNTER
Care Due:                  Date            Visit Type   Department     Provider  --------------------------------------------------------------------------------                                EP -                              PRIMARY [facility]  Last Visit: 02-      CARE ([facility])   PRIMARY CARE   Marco A Montes                              EP - PRIMARY [facility]  Next Visit: 05-      CARE ([facility])   PRIMARY CARE   Marco A Montes                                                            Last  Test          Frequency    Reason                     Performed    Due Date  --------------------------------------------------------------------------------    CMP.........  12 months..  furosemide...............  Not Found    Overdue    Powered by [email or web address]. Reference number: [id number].   3/22/2022 9:02:21 AM CDT

## 2022-03-23 ENCOUNTER — PATIENT OUTREACH (OUTPATIENT)
Dept: ADMINISTRATIVE | Facility: OTHER | Age: 42
End: 2022-03-23
Payer: MEDICAID

## 2022-03-23 RX ORDER — TRAMADOL HYDROCHLORIDE 50 MG/1
50 TABLET ORAL EVERY 6 HOURS PRN
Qty: 30 TABLET | Refills: 0 | OUTPATIENT
Start: 2022-03-23 | End: 2022-04-02

## 2022-03-23 NOTE — PROGRESS NOTES
LINKS immunization registry updated  Care Everywhere updated  Health Maintenance updated  DIS/Chart reviewed for overdue Proactive [facility] Encounters ([facility]) health maintenance testing (CRS, Breast Ca, Diabetic Eye Exam)   Orders entered:N/A

## 2022-03-24 ENCOUNTER — PATIENT MESSAGE (OUTPATIENT)
Dept: PRIMARY CARE CLINIC | Facility: CLINIC | Age: 42
End: 2022-03-24
Payer: MEDICAID

## 2022-03-24 NOTE — TELEPHONE ENCOUNTER
Call tell pt control medication not refilled over the phone If desires go to pain clinic can get referral

## 2022-03-28 ENCOUNTER — OFFICE VISIT (OUTPATIENT)
Dept: ORTHOPEDICS | Facility: CLINIC | Age: 42
End: 2022-03-28
Payer: MEDICAID

## 2022-03-28 VITALS
HEART RATE: 63 BPM | HEIGHT: 68 IN | BODY MASS INDEX: 37.46 KG/M2 | WEIGHT: 247.13 LBS | SYSTOLIC BLOOD PRESSURE: 116 MMHG | DIASTOLIC BLOOD PRESSURE: 74 MMHG

## 2022-03-28 DIAGNOSIS — M77.11 LATERAL EPICONDYLITIS, RIGHT ELBOW: Primary | ICD-10-CM

## 2022-03-28 PROCEDURE — 3078F DIAST BP <80 MM HG: CPT | Mod: CPTII,,, | Performed by: ORTHOPAEDIC SURGERY

## 2022-03-28 PROCEDURE — 99213 OFFICE O/P EST LOW 20 MIN: CPT | Mod: PBBFAC,PN | Performed by: ORTHOPAEDIC SURGERY

## 2022-03-28 PROCEDURE — 1159F MED LIST DOCD IN RCRD: CPT | Mod: CPTII,,, | Performed by: ORTHOPAEDIC SURGERY

## 2022-03-28 PROCEDURE — 1160F RVW MEDS BY RX/DR IN RCRD: CPT | Mod: CPTII,,, | Performed by: ORTHOPAEDIC SURGERY

## 2022-03-28 PROCEDURE — 1159F PR MEDICATION LIST DOCUMENTED IN MEDICAL RECORD: ICD-10-PCS | Mod: CPTII,,, | Performed by: ORTHOPAEDIC SURGERY

## 2022-03-28 PROCEDURE — 3074F SYST BP LT 130 MM HG: CPT | Mod: CPTII,,, | Performed by: ORTHOPAEDIC SURGERY

## 2022-03-28 PROCEDURE — 3074F PR MOST RECENT SYSTOLIC BLOOD PRESSURE < 130 MM HG: ICD-10-PCS | Mod: CPTII,,, | Performed by: ORTHOPAEDIC SURGERY

## 2022-03-28 PROCEDURE — 3008F BODY MASS INDEX DOCD: CPT | Mod: CPTII,,, | Performed by: ORTHOPAEDIC SURGERY

## 2022-03-28 PROCEDURE — 3078F PR MOST RECENT DIASTOLIC BLOOD PRESSURE < 80 MM HG: ICD-10-PCS | Mod: CPTII,,, | Performed by: ORTHOPAEDIC SURGERY

## 2022-03-28 PROCEDURE — 99999 PR PBB SHADOW E&M-EST. PATIENT-LVL III: CPT | Mod: PBBFAC,,, | Performed by: ORTHOPAEDIC SURGERY

## 2022-03-28 PROCEDURE — 3008F PR BODY MASS INDEX (BMI) DOCUMENTED: ICD-10-PCS | Mod: CPTII,,, | Performed by: ORTHOPAEDIC SURGERY

## 2022-03-28 PROCEDURE — 99999 PR PBB SHADOW E&M-EST. PATIENT-LVL III: ICD-10-PCS | Mod: PBBFAC,,, | Performed by: ORTHOPAEDIC SURGERY

## 2022-03-28 PROCEDURE — 99213 PR OFFICE/OUTPT VISIT, EST, LEVL III, 20-29 MIN: ICD-10-PCS | Mod: S$PBB,,, | Performed by: ORTHOPAEDIC SURGERY

## 2022-03-28 PROCEDURE — 1160F PR REVIEW ALL MEDS BY PRESCRIBER/CLIN PHARMACIST DOCUMENTED: ICD-10-PCS | Mod: CPTII,,, | Performed by: ORTHOPAEDIC SURGERY

## 2022-03-28 PROCEDURE — 99213 OFFICE O/P EST LOW 20 MIN: CPT | Mod: S$PBB,,, | Performed by: ORTHOPAEDIC SURGERY

## 2022-03-28 RX ORDER — EZETIMIBE 10 MG/1
10 TABLET ORAL DAILY
COMMUNITY
Start: 2022-03-22

## 2022-03-28 RX ORDER — MELOXICAM 7.5 MG/1
TABLET ORAL
COMMUNITY
Start: 2022-03-22 | End: 2022-11-05

## 2022-03-28 RX ORDER — CYCLOBENZAPRINE HCL 10 MG
TABLET ORAL
COMMUNITY
Start: 2022-03-22 | End: 2022-06-06

## 2022-03-28 NOTE — PROGRESS NOTES
"Subjective:    Patient ID:  Lennie Amor is a 41 y.o. y.o. female who presents for f/u visit for Pain of the Right Elbow      Patient returns for follow-up for right elbow pain. She was seen previously by GALE Hardin and her history is well-documented in the 2/14/2022 office note. States she is scheduled to begin PT next week.         Objective:     /74 (BP Location: Left arm, Patient Position: Sitting, BP Method: Large (Automatic))   Pulse 63   Ht 5' 8" (1.727 m)   Wt 112.1 kg (247 lb 2.2 oz)   LMP 03/03/2022   BMI 37.58 kg/m²     Ortho Exam     42 yo female in NAD; alert, oriented x 3    Head: atraumatic  Eyes: EOM are normal. Right eye exhibits no discharge. Left eye exhibits no discharge  Cardiovascular: normal rate    Pulmonary/Chest: effort normal; no respiratory distress  Abdominal: soft    Right elbow: tender lateral epicondyle/proximal common extensor; FROM; positive tennis elbow sign        Assessment & Plan:     1. Lateral epicondylitis, right elbow      1.  Findings, diagnosis, treatment options/risks/benefits were reviewed  2.  Universal wrist brace right; brace wear/care instructions reviewed  3.  Continue Voltaren gel prn  4.  Proceed with supervised PT as scheduled  5.  Maintain judicious activity modification/limitation  6.  Follow-up in 2-3 months or prn  "

## 2022-04-29 LAB
CHOLESTEROL, TOTAL: 175
HDLC SERPL-MCNC: 42 MG/DL
LDLC SERPL CALC-MCNC: 115 MG/DL (ref 0–160)
TRIGL SERPL-MCNC: 99 MG/DL
VLDLC SERPL-MCNC: 18 MG/DL

## 2022-05-04 ENCOUNTER — OFFICE VISIT (OUTPATIENT)
Dept: PRIMARY CARE CLINIC | Facility: CLINIC | Age: 42
End: 2022-05-04
Payer: MEDICAID

## 2022-05-04 VITALS
BODY MASS INDEX: 38.57 KG/M2 | SYSTOLIC BLOOD PRESSURE: 104 MMHG | HEIGHT: 68 IN | OXYGEN SATURATION: 97 % | RESPIRATION RATE: 16 BRPM | HEART RATE: 71 BPM | DIASTOLIC BLOOD PRESSURE: 52 MMHG | TEMPERATURE: 98 F | WEIGHT: 254.5 LBS

## 2022-05-04 DIAGNOSIS — M77.11 LATERAL EPICONDYLITIS OF RIGHT ELBOW: ICD-10-CM

## 2022-05-04 DIAGNOSIS — E78.00 PURE HYPERCHOLESTEROLEMIA: ICD-10-CM

## 2022-05-04 DIAGNOSIS — Z72.0 TOBACCO ABUSE: ICD-10-CM

## 2022-05-04 DIAGNOSIS — K80.20 GALLSTONES: Primary | ICD-10-CM

## 2022-05-04 PROCEDURE — 3008F BODY MASS INDEX DOCD: CPT | Mod: CPTII,,, | Performed by: FAMILY MEDICINE

## 2022-05-04 PROCEDURE — 1159F PR MEDICATION LIST DOCUMENTED IN MEDICAL RECORD: ICD-10-PCS | Mod: CPTII,,, | Performed by: FAMILY MEDICINE

## 2022-05-04 PROCEDURE — 99214 OFFICE O/P EST MOD 30 MIN: CPT | Mod: S$PBB,,, | Performed by: FAMILY MEDICINE

## 2022-05-04 PROCEDURE — 3078F PR MOST RECENT DIASTOLIC BLOOD PRESSURE < 80 MM HG: ICD-10-PCS | Mod: CPTII,,, | Performed by: FAMILY MEDICINE

## 2022-05-04 PROCEDURE — 99999 PR PBB SHADOW E&M-EST. PATIENT-LVL IV: CPT | Mod: PBBFAC,,, | Performed by: FAMILY MEDICINE

## 2022-05-04 PROCEDURE — 99999 PR PBB SHADOW E&M-EST. PATIENT-LVL IV: ICD-10-PCS | Mod: PBBFAC,,, | Performed by: FAMILY MEDICINE

## 2022-05-04 PROCEDURE — 99214 OFFICE O/P EST MOD 30 MIN: CPT | Mod: PBBFAC,PN | Performed by: FAMILY MEDICINE

## 2022-05-04 PROCEDURE — 1159F MED LIST DOCD IN RCRD: CPT | Mod: CPTII,,, | Performed by: FAMILY MEDICINE

## 2022-05-04 PROCEDURE — 3078F DIAST BP <80 MM HG: CPT | Mod: CPTII,,, | Performed by: FAMILY MEDICINE

## 2022-05-04 PROCEDURE — 3074F SYST BP LT 130 MM HG: CPT | Mod: CPTII,,, | Performed by: FAMILY MEDICINE

## 2022-05-04 PROCEDURE — 3074F PR MOST RECENT SYSTOLIC BLOOD PRESSURE < 130 MM HG: ICD-10-PCS | Mod: CPTII,,, | Performed by: FAMILY MEDICINE

## 2022-05-04 PROCEDURE — 99214 PR OFFICE/OUTPT VISIT, EST, LEVL IV, 30-39 MIN: ICD-10-PCS | Mod: S$PBB,,, | Performed by: FAMILY MEDICINE

## 2022-05-04 PROCEDURE — 3008F PR BODY MASS INDEX (BMI) DOCUMENTED: ICD-10-PCS | Mod: CPTII,,, | Performed by: FAMILY MEDICINE

## 2022-05-04 RX ORDER — TRAMADOL HYDROCHLORIDE 50 MG/1
50 TABLET ORAL EVERY 6 HOURS PRN
COMMUNITY
Start: 2022-04-19 | End: 2022-05-04 | Stop reason: SDUPTHER

## 2022-05-04 RX ORDER — BUTALBITAL, ACETAMINOPHEN AND CAFFEINE 50; 325; 40 MG/1; MG/1; MG/1
TABLET ORAL
Qty: 30 TABLET | Refills: 2 | Status: SHIPPED | OUTPATIENT
Start: 2022-05-04 | End: 2022-07-01

## 2022-05-04 RX ORDER — TRAMADOL HYDROCHLORIDE 50 MG/1
50 TABLET ORAL DAILY PRN
Qty: 30 TABLET | Refills: 1 | Status: SHIPPED | OUTPATIENT
Start: 2022-05-04 | End: 2022-06-06

## 2022-05-04 NOTE — PROGRESS NOTES
Subjective:       Patient ID: Lennie Amor is a 42 y.o. female.    Chief Complaint: Follow-up (Right elbow) and Medication Refill    HPI:  42-year-old white female in for follow-up right elbow pain--was picking up empty box--saw orthopedist --toe patient has tennis elbow--given brace for wrist --told needs 6 months of physical therapy if no relief then will decide if needs surgery or not. Unable get cortisone due had knee injected and swollen up like a balloon.  Pain in the elbow even at rest pain with flexion extension supination and pronation.           February 2020 41-year-old female in for 6 week follow-up--patient lifted an empty box injured right elbow just superior to the olecranon process and inferior to olecranon process similar to an Ace leave being pulled up over the elbow.  EMG done which was normal showing no ulnar nerve or median nerve pathology. Hurts all day on---wakes up in the morning and arms stiff.  Patient was seen by neurologist told patient to wait see with the orthopedist says.  Orthopedic appoint was scheduled for today but was canceled because  Had an emergency--rescheduled 02/14/2022  Tried to  a drink with right hand drop did.  Able to flex to about 90 degree able to supinate pronate.     Patient on tramadol Mobic no muscle relaxer--using Epson salt to soak      ROS:    Skin: no psoriasis, eczema, skin cancer  HEENT:  +history migraine headache--good since patient is on medication,no  ocular pain, blurred vision, diplopia, epistaxis, hoarseness change in voice, +hypothyroid  Lung: No pneumonia, asthma, Tb, wheezing, SOB, +smoking half pack per day  Heart: No chest pain, +ankle edemaon lasix , no palpitations, MI, peter murmur, hypertension,+ hyperlipidemia--no stent bypass arrhythmia  Abdomen: No nausea, vomiting, diarrhea, constipation, ulcers, hepatitis, gallbladder disease, melena, hematochezia, hematemesis +cholelithiasis  : no UTI, renal disease, stones   GYN  LMP 04/03/2022 Mammogram schedule in [facility]   MS: no fractures, O/A, lupus, rheumatoid, gout history of osteoarthritis history of right knee pain history of lateral epicondylitis see history of present illness  Neuro: No dizziness, LOC, seizures   No diabetes, no anemia, no anxiety, no depression     3 children work  lives alone     Objective:   Physical Exam:  General: Well nourished, well developed, no acute distress  Skin: No lesions  HEENT: Eyes PERRLA, EOM intact, nose patent, throat non-erythematous   NECK: Supple, no bruits, No JVD, no nodes  Lungs: Clear, no rales, rhonchi, wheezing  Heart: Regular rate and rhythm, no murmurs, gallops, or rubs  Abdomen: flat, bowel sounds positive, no tenderness, or organomegaly  MS:  Pain approximately 2 cm above the right elbow circumferential in 2 cm below the elbow tenderness in the right lateral epicondyle area and ulnar groove in the area of the ulnar nerve tender with palpation pain with flexion about 90° decreased hand grasps good opposition thumb index thumb 5th digit good flexion extension forearm  Neuro: Alert, CN intact, oriented X 3  Extremities: No cyanosis, clubbing, or edema         Assessment:       1. Gallstones    2. Pure hypercholesterolemia    3. Lateral epicondylitis of right elbow    4. Tobacco abuse        Plan:       Gallstones    Pure hypercholesterolemia    Lateral epicondylitis of right elbow    Tobacco abuse    Other orders  -     traMADoL (ULTRAM) 50 mg tablet; Take 1 tablet (50 mg total) by mouth daily as needed.  Dispense: 30 tablet; Refill: 1  -     butalbital-acetaminophen-caffeine -40 mg (FIORICET, ESGIC) -40 mg per tablet; One p.o. q.d. p.r.n. headache  Dispense: 30 tablet; Refill: 2        Orthopedic consult --Right arm pain--lateral epicondylitis----seeing orthopedic--told needs 6 months of physical therapy if not better in 6 months will consider surgery--okay tramadol--needs to be taking NSAIDs 1 Mobic  [place] seeing Dr. Martin  History migraine headaches on Fioricet  Hypothyroidism patient on Tapazole  Tobacco abuse Needs to DC smoking  Ankle edema on Lasix 20  History cholelithiasis  History bipolar  Hyperlipidemia on Lipitor  Lab patient needs to do lab as in computer last lab 2018  Health maintenance hepatitis C HIV  Patient claims did lab with Dr. Carter

## 2022-05-16 ENCOUNTER — PATIENT OUTREACH (OUTPATIENT)
Dept: ADMINISTRATIVE | Facility: HOSPITAL | Age: 42
End: 2022-05-16
Payer: MEDICAID

## 2022-06-02 NOTE — TELEPHONE ENCOUNTER
Care Due:                  Date            Visit Type   Department     Provider  --------------------------------------------------------------------------------                                EP -                              PRIMARY [facility]  Last Visit: 05-      CARE ([facility])   PRIMARY CARE   Marco A Montes                              EP - PRIMARY [facility]  Next Visit: 08-      CARE ([facility])   PRIMARY CARE   Marco A Montes                                                            Last  Test          Frequency    Reason                     Performed    Due Date  --------------------------------------------------------------------------------    CMP.........  12 months..  furosemide...............  Not Found    Overdue    [employer] Embedded Care Gaps. Reference number: [id number]. 6/02/2022   10:49:53 AM CDT

## 2022-06-06 RX ORDER — CYCLOBENZAPRINE HCL 10 MG
TABLET ORAL
Qty: 30 TABLET | Refills: 5 | Status: SHIPPED | OUTPATIENT
Start: 2022-06-06 | End: 2022-09-19

## 2022-06-06 RX ORDER — TRAMADOL HYDROCHLORIDE 50 MG/1
50 TABLET ORAL DAILY PRN
Qty: 30 TABLET | Refills: 1 | OUTPATIENT
Start: 2022-06-06

## 2022-06-06 NOTE — TELEPHONE ENCOUNTER
Call tell ultram denied control medication not refilled over the phone if needs reffills now can do virtual visits

## 2022-07-27 ENCOUNTER — TELEPHONE (OUTPATIENT)
Dept: PRIMARY CARE CLINIC | Facility: CLINIC | Age: 42
End: 2022-07-27
Payer: MEDICAID

## 2022-07-27 NOTE — TELEPHONE ENCOUNTER
----- Message from Kesha Cheema sent at 7/27/2022  8:32 AM CDT -----  Contact: Patient, [phone]  No blue slot available to schedule an appointment for the patient.  Patient is established with which PCP: Dr ABBEY Montes  Reason for the visit: Under left eye swollen  Would the patient like a call back, or a response through their [facility] portal?:  Call back.

## 2022-07-27 NOTE — TELEPHONE ENCOUNTER
Called patient and notified her that we have nothing available this week and Dr. Montes is out on fridays. She would either need to go to urgent care or ER and keep her scheduled appointment with Dr. Montes on august 4th  Patient verbalized understanding

## 2022-08-04 ENCOUNTER — OFFICE VISIT (OUTPATIENT)
Dept: PRIMARY CARE CLINIC | Facility: CLINIC | Age: 42
End: 2022-08-04
Payer: MEDICAID

## 2022-08-04 VITALS
HEART RATE: 88 BPM | HEIGHT: 68 IN | SYSTOLIC BLOOD PRESSURE: 122 MMHG | RESPIRATION RATE: 18 BRPM | WEIGHT: 252.56 LBS | DIASTOLIC BLOOD PRESSURE: 76 MMHG | BODY MASS INDEX: 38.28 KG/M2 | OXYGEN SATURATION: 99 %

## 2022-08-04 DIAGNOSIS — M15.8 OTHER OSTEOARTHRITIS INVOLVING MULTIPLE JOINTS: ICD-10-CM

## 2022-08-04 DIAGNOSIS — K80.20 GALLSTONES: ICD-10-CM

## 2022-08-04 DIAGNOSIS — F31.9 BIPOLAR I DISORDER: ICD-10-CM

## 2022-08-04 DIAGNOSIS — E78.00 PURE HYPERCHOLESTEROLEMIA: ICD-10-CM

## 2022-08-04 DIAGNOSIS — Z72.0 TOBACCO ABUSE: ICD-10-CM

## 2022-08-04 DIAGNOSIS — M77.11 LATERAL EPICONDYLITIS OF RIGHT ELBOW: Primary | ICD-10-CM

## 2022-08-04 PROCEDURE — 99999 PR PBB SHADOW E&M-EST. PATIENT-LVL III: ICD-10-PCS | Mod: PBBFAC,,, | Performed by: FAMILY MEDICINE

## 2022-08-04 PROCEDURE — 99213 OFFICE O/P EST LOW 20 MIN: CPT | Mod: PBBFAC,PN | Performed by: FAMILY MEDICINE

## 2022-08-04 PROCEDURE — 3074F SYST BP LT 130 MM HG: CPT | Mod: CPTII,,, | Performed by: FAMILY MEDICINE

## 2022-08-04 PROCEDURE — 99999 PR PBB SHADOW E&M-EST. PATIENT-LVL III: CPT | Mod: PBBFAC,,, | Performed by: FAMILY MEDICINE

## 2022-08-04 PROCEDURE — 3008F PR BODY MASS INDEX (BMI) DOCUMENTED: ICD-10-PCS | Mod: CPTII,,, | Performed by: FAMILY MEDICINE

## 2022-08-04 PROCEDURE — 99214 PR OFFICE/OUTPT VISIT, EST, LEVL IV, 30-39 MIN: ICD-10-PCS | Mod: S$PBB,,, | Performed by: FAMILY MEDICINE

## 2022-08-04 PROCEDURE — 99214 OFFICE O/P EST MOD 30 MIN: CPT | Mod: S$PBB,,, | Performed by: FAMILY MEDICINE

## 2022-08-04 PROCEDURE — 3008F BODY MASS INDEX DOCD: CPT | Mod: CPTII,,, | Performed by: FAMILY MEDICINE

## 2022-08-04 PROCEDURE — 3078F PR MOST RECENT DIASTOLIC BLOOD PRESSURE < 80 MM HG: ICD-10-PCS | Mod: CPTII,,, | Performed by: FAMILY MEDICINE

## 2022-08-04 PROCEDURE — 3074F PR MOST RECENT SYSTOLIC BLOOD PRESSURE < 130 MM HG: ICD-10-PCS | Mod: CPTII,,, | Performed by: FAMILY MEDICINE

## 2022-08-04 PROCEDURE — 3078F DIAST BP <80 MM HG: CPT | Mod: CPTII,,, | Performed by: FAMILY MEDICINE

## 2022-08-04 RX ORDER — TRAMADOL HYDROCHLORIDE 100 MG/1
100 TABLET, EXTENDED RELEASE ORAL DAILY
Qty: 30 TABLET | Refills: 0 | Status: SHIPPED | OUTPATIENT
Start: 2022-08-04 | End: 2022-11-08 | Stop reason: SDUPTHER

## 2022-08-04 RX ORDER — TRAMADOL HYDROCHLORIDE 100 MG/1
100 TABLET, EXTENDED RELEASE ORAL DAILY
COMMUNITY
End: 2022-08-04 | Stop reason: SDUPTHER

## 2022-08-04 NOTE — PROGRESS NOTES
Subjective:       Patient ID: Lennie Amor is a 42 y.o. female.    Chief Complaint: Follow-up (3 month) and Joint Swelling (Right elbow pain)    HPI:  42-year-old white female in for follow-up right elbow pain--injury occurred August last year patient was lifting an empty bow.  Intensity of the pain is not nearly as severe is a in the past--but does flare up every now in then.  Patient was folding 3 baskets of close the other day --if uses it to much gets pain . Once starts weight is about 1-1/2 hours if pain fails to go away then will take some medications.      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  Pain mainly with bending and in the olecranon area especially in the area of the lateral epicondyle--tennis elbow .  When elbow flares up told put a brace on her wrist--also given diclofenac cream to rub on elbow. Did not get elbow injected       ROS:    Skin: no psoriasis, eczema, skin cancer  HEENT:  +history migraine headache--good since patient is on medication,no  ocular pain, blurred vision, diplopia, epistaxis, hoarseness change in voice, +hypothyroid  Lung: No pneumonia, asthma, Tb, wheezing, SOB, +smoking half pack per day  Heart: No chest pain, +ankle edemaon lasix , no palpitations, MI, peter murmur, hypertens cardiologist Dr. Raul goldman,+ hyperlipidemia--no stent bypass arrhythmia Hx angina on ranexa .  Schedule for echocardiogram next month--  Abdomen: No nausea, vomiting, diarrhea, constipation, ulcers, hepatitis, gallbladder disease, melena, hematochezia, hematemesis +cholelithiasis  : no UTI, renal disease, stones   GYN LMP Sunday Mammogram schedule in nFeb   MS: no fractures, O/A, lupus, rheumatoid, gout history of osteoarthritis history of right knee pain history of lateral epicondylitis see history of present illness  Neuro: No dizziness, LOC, seizures   No diabetes, no anemia, no anxiety, no depression     3 children work  lives alone      Objective:   Physical Exam:  General: Well nourished, well developed, no acute distress  Skin: No lesions  HEENT: Eyes PERRLA, EOM intact, nose patent, throat non-erythematous erythema right TM    NECK: Supple, no bruits, No JVD, no nodes  Lungs: Clear, no rales, rhonchi, wheezing  Heart: Regular rate and rhythm, no murmurs, gallops, or rubs  Abdomen: flat, bowel sounds positive, no tenderness, or organomegaly  MS:  Pain approximately 2 cm above the right elbow circumferential in 2 cm below the elbow tenderness in the right lateral epicondyle area pain with flexion and extension   Neuro: Alert, CN intact, oriented X 3  Extremities: No cyanosis, clubbing, or edema         Assessment:       1. Lateral epicondylitis of right elbow    2. Other osteoarthritis involving multiple joints    3. Gallstones    4. Pure hypercholesterolemia    5. Bipolar I disorder    6. Tobacco abuse        Plan:       Lateral epicondylitis of right elbow  -     traMADoL (ULTRAM-ER) 100 MG Tb24; Take 1 tablet (100 mg total) by mouth once daily.  Dispense: 30 tablet; Refill: 0    Other osteoarthritis involving multiple joints    Gallstones    Pure hypercholesterolemia    Bipolar I disorder    Tobacco abuse        Right lateral epicondylitis--tennis elbow--tramadol for breakthrough pain--continue Mobic--Voltaren gel--if desires psych inject right elbow but patient had a reaction with injection into her right  History migraine headaches on Fioricet--Imitrex Topamax  Hypothyroidism patient on Tapazole  Tobacco abuse Needs to DC smoking  Ankle edema on Lasix 20  History cholelithiasis--asymptomatic  History bipolar  Hyperlipidemia on Lipitor   Hx angina on ranexa --has appt DR Carter for echocardiogram  Lab patient needs to do lab as in computer last lab 2018  Health maintenance hepatitis C HIV  Patient claims did lab with Dr. Carter

## 2022-09-23 ENCOUNTER — OCCUPATIONAL HEALTH (OUTPATIENT)
Dept: URGENT CARE | Facility: CLINIC | Age: 42
End: 2022-09-23

## 2022-09-23 DIAGNOSIS — Z02.83 ENCOUNTER FOR DRUG SCREENING: Primary | ICD-10-CM

## 2022-09-23 PROCEDURE — 80305 DRUG TEST PRSMV DIR OPT OBS: CPT | Mod: S$GLB,,, | Performed by: EMERGENCY MEDICINE

## 2022-09-23 PROCEDURE — 80305 OOH COLLECTION ONLY DRUG SCREEN: ICD-10-PCS | Mod: S$GLB,,, | Performed by: EMERGENCY MEDICINE

## 2022-09-27 ENCOUNTER — PATIENT MESSAGE (OUTPATIENT)
Dept: PRIMARY CARE CLINIC | Facility: CLINIC | Age: 42
End: 2022-09-27
Payer: MEDICAID

## 2022-11-08 ENCOUNTER — OFFICE VISIT (OUTPATIENT)
Dept: PRIMARY CARE CLINIC | Facility: CLINIC | Age: 42
End: 2022-11-08
Payer: MEDICAID

## 2022-11-08 DIAGNOSIS — G25.81 RESTLESS LEG SYNDROME: ICD-10-CM

## 2022-11-08 DIAGNOSIS — F31.9 BIPOLAR I DISORDER: ICD-10-CM

## 2022-11-08 DIAGNOSIS — M77.11 LATERAL EPICONDYLITIS OF RIGHT ELBOW: ICD-10-CM

## 2022-11-08 DIAGNOSIS — M25.521 RIGHT ELBOW PAIN: ICD-10-CM

## 2022-11-08 DIAGNOSIS — Z11.4 ENCOUNTER FOR SCREENING FOR HIV: ICD-10-CM

## 2022-11-08 DIAGNOSIS — Z11.59 NEED FOR HEPATITIS C SCREENING TEST: Primary | ICD-10-CM

## 2022-11-08 DIAGNOSIS — E78.00 PURE HYPERCHOLESTEROLEMIA: ICD-10-CM

## 2022-11-08 DIAGNOSIS — Z72.0 TOBACCO ABUSE: ICD-10-CM

## 2022-11-08 DIAGNOSIS — K80.20 GALLSTONES: ICD-10-CM

## 2022-11-08 DIAGNOSIS — M25.561 RIGHT KNEE PAIN, UNSPECIFIED CHRONICITY: ICD-10-CM

## 2022-11-08 PROCEDURE — 99213 PR OFFICE/OUTPT VISIT, EST, LEVL III, 20-29 MIN: ICD-10-PCS | Mod: 95,,, | Performed by: FAMILY MEDICINE

## 2022-11-08 PROCEDURE — 99213 OFFICE O/P EST LOW 20 MIN: CPT | Mod: 95,,, | Performed by: FAMILY MEDICINE

## 2022-11-08 RX ORDER — TRAMADOL HYDROCHLORIDE 100 MG/1
100 TABLET, EXTENDED RELEASE ORAL DAILY
Qty: 30 TABLET | Refills: 0 | Status: SHIPPED | OUTPATIENT
Start: 2022-11-08 | End: 2023-09-13 | Stop reason: SDUPTHER

## 2022-11-08 RX ORDER — GABAPENTIN 300 MG/1
CAPSULE ORAL
Qty: 30 CAPSULE | Refills: 11 | Status: SHIPPED | OUTPATIENT
Start: 2022-11-08 | End: 2023-09-15

## 2022-11-08 NOTE — PROGRESS NOTES
Subjective:       Patient ID: Lennie Amor is a 42 y.o. female.    Chief Complaint: No chief complaint on file.    HPI:  42-year-old white female in for 3 month follow-up---right elbow still hurts a little bit--much better than it was--almost able to completely extend the elbow--able to supinate and pronate the forearm.  Strength in hand and forearm were normal.  Patient is a dispatcher--on computer a lot.  Still as a wrist band that orthopedic gave patient uses it a lot.  On Ultram Mobic Flexeril. Pain pill one to 2 x week --muscle relaxers once a week--taking meloxicam every day an using a topical ointment.   At nighty occas restless leg syndrome .           Office visit 08/04/2022 42-year-old white female in for follow-up right elbow pain--injury occurred August last year patient was lifting an empty bow.  Intensity of the pain is not nearly as severe is a in the past--but does flare up every now in then.  Patient was folding 3 baskets of close the other day --if uses it to much gets pain . Once starts weight is about 1-1/2 hours if pain fails to go away then will take some medications.      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  Pain mainly with bending and in the olecranon area especially in the area of the lateral epicondyle--tennis elbow .  When elbow flares up told put a brace on her wrist--also given diclofenac cream to rub on elbow. Did not get elbow injected       ROS:  No significant change except for history of present  Skin: no psoriasis, eczema, skin cancer  HEENT:  +history migraine headache--good since patient is on medication,no  ocular pain, blurred vision, diplopia, epistaxis, hoarseness change in voice, +hypothyroid  Lung: No pneumonia, asthma, Tb, wheezing, SOB, +smoking half pack per day  Heart: No chest pain, +ankle edemaon lasix , no palpitations, MI, peter murmur, hypertens cardiologist Dr. Raul goldman,+ hyperlipidemia--no stent bypass arrhythmia Hx angina on  ranexa .  Schedule for echocardiogram next month--  Abdomen: No nausea, vomiting, diarrhea, constipation, ulcers, hepatitis, gallbladder disease, melena, hematochezia, hematemesis +cholelithiasis  : no UTI, renal disease, stones   GYN LMP Sunday Mammogram schedule in nFeb   MS: no fractures, O/A, lupus, rheumatoid, gout history of osteoarthritis history of right knee pain history of lateral epicondylitis see history of present illness  Neuro: No dizziness, LOC, seizures   No diabetes, no anemia, no anxiety, no depression     3 children work  lives alone     Objective:   Physical Exam:  General: Well nourished, well developed, no acute distress  Skin: No lesions  HEENT: Eyes PERRLA, EOM intact, nose patent, throat non-erythematous erythema right TM    NECK: Supple, no bruits, No JVD, no nodes  Lungs: Clear, no rales, rhonchi, wheezing  Heart: Regular rate and rhythm, no murmurs, gallops, or rubs  Abdomen: flat, bowel sounds positive, no tenderness, or organomegaly  MS:  Pain approximately 2 cm above the right elbow circumferential in 2 cm below the elbow tenderness in the right lateral epicondyle area pain with flexion and extension   Neuro: Alert, CN intact, oriented X 3  Extremities: No cyanosis, clubbing, or edema         Assessment:       1. Need for hepatitis C screening test    2. Encounter for screening for HIV    3. Lateral epicondylitis of right elbow    4. Restless leg syndrome    5. Right elbow pain    6. Right knee pain, unspecified chronicity    7. Tobacco abuse    8. Gallstones    9. Pure hypercholesterolemia    10. Bipolar I disorder        Plan:       Need for hepatitis C screening test  -     Hepatitis C Antibody; Future; Expected date: 11/08/2022    Encounter for screening for HIV  -     HIV 1/2 Ag/Ab (4th Gen); Future; Expected date: 11/08/2022    Lateral epicondylitis of right elbow  -     traMADoL (ULTRAM-ER) 100 MG Tb24; Take 1 tablet (100 mg total) by mouth once daily.   Dispense: 30 tablet; Refill: 0    Restless leg syndrome    Right elbow pain    Right knee pain, unspecified chronicity    Tobacco abuse    Gallstones    Pure hypercholesterolemia    Bipolar I disorder    Other orders  -     gabapentin (NEURONTIN) 300 MG capsule; One p.o. q.h.s. for restless leg syndrome  Dispense: 30 capsule; Refill: 11      Right lateral epicondylitis--tennis elbow-overall elbow doing much better does need refill of all medication--Ultram--has refills of Flexeril and Mobic  Restless leg syndrome trial of gabapentin 300 mg 1 p.o. q.h.s. do not take with Ultram--legs cramping up waking patient up at night  Other medical issues  History migraine headaches on Fioricet--Imitrex Topamax  Hypothyroidism patient on Tapazole  Tobacco abuse Needs to DC smoking  Ankle edema on Lasix 20  History cholelithiasis--asymptomatic  History bipolar  Hyperlipidemia on Lipitor   Hx angina on ranexa --has appt DR Carter for echocardiogram  Lab patient needs to do lab as in computer last lab 2018  Health maintenance hepatitis C HIV  Patient claims did lab with Dr. Carter      Established Patient - Audio Only Telehealth Visit     The patient location is:  Work  The chief complaint leading to consultation is:  Right elbow pain/restless leg syndrome  Visit type: Virtual visit with audio only (telephone)  Total time spent with patient:  30 minute       The reason for the audio only service rather than synchronous audio and video virtual visit was related to technical difficulties or patient preference/necessity.     Each patient to whom I provide medical services by telemedicine is:  (1) informed of the relationship between the physician and patient and the respective role of any other health care provider with respect to management of the patient; and (2) notified that they may decline to receive medical services by telemedicine and may withdraw from such care at any time. Patient verbally consented to receive this  service via voice-only telephone call.       HPI:  See history of present illness above     Assessment and plan:  See plan above                        This service was not originating from a related E/M service provided within the previous 7 days nor will  to an E/M service or procedure within the next 24 hours or my soonest available appointment.  Prevailing standard of care was able to be met in this audio-only visit.

## 2022-12-01 PROBLEM — K80.20 CHOLELITHIASIS: Status: ACTIVE | Noted: 2022-12-01

## 2022-12-01 PROBLEM — K83.8 DILATION OF COMMON BILE DUCT: Status: ACTIVE | Noted: 2022-12-01

## 2022-12-06 ENCOUNTER — OFFICE VISIT (OUTPATIENT)
Dept: SURGERY | Facility: CLINIC | Age: 42
End: 2022-12-06
Payer: MEDICAID

## 2022-12-06 VITALS
SYSTOLIC BLOOD PRESSURE: 119 MMHG | HEIGHT: 68 IN | BODY MASS INDEX: 38.19 KG/M2 | DIASTOLIC BLOOD PRESSURE: 74 MMHG | WEIGHT: 252 LBS | HEART RATE: 62 BPM | RESPIRATION RATE: 18 BRPM

## 2022-12-06 DIAGNOSIS — K80.20 CALCULUS OF GALLBLADDER WITHOUT CHOLECYSTITIS WITHOUT OBSTRUCTION: Primary | ICD-10-CM

## 2022-12-06 PROCEDURE — 3074F PR MOST RECENT SYSTOLIC BLOOD PRESSURE < 130 MM HG: ICD-10-PCS | Mod: CPTII,,, | Performed by: SURGERY

## 2022-12-06 PROCEDURE — 3008F PR BODY MASS INDEX (BMI) DOCUMENTED: ICD-10-PCS | Mod: CPTII,,, | Performed by: SURGERY

## 2022-12-06 PROCEDURE — 1160F RVW MEDS BY RX/DR IN RCRD: CPT | Mod: CPTII,,, | Performed by: SURGERY

## 2022-12-06 PROCEDURE — 1159F PR MEDICATION LIST DOCUMENTED IN MEDICAL RECORD: ICD-10-PCS | Mod: CPTII,,, | Performed by: SURGERY

## 2022-12-06 PROCEDURE — 99999 PR PBB SHADOW E&M-EST. PATIENT-LVL IV: CPT | Mod: PBBFAC,,, | Performed by: SURGERY

## 2022-12-06 PROCEDURE — 3078F DIAST BP <80 MM HG: CPT | Mod: CPTII,,, | Performed by: SURGERY

## 2022-12-06 PROCEDURE — 3074F SYST BP LT 130 MM HG: CPT | Mod: CPTII,,, | Performed by: SURGERY

## 2022-12-06 PROCEDURE — 3008F BODY MASS INDEX DOCD: CPT | Mod: CPTII,,, | Performed by: SURGERY

## 2022-12-06 PROCEDURE — 99204 OFFICE O/P NEW MOD 45 MIN: CPT | Mod: S$PBB,,, | Performed by: SURGERY

## 2022-12-06 PROCEDURE — 99204 PR OFFICE/OUTPT VISIT, NEW, LEVL IV, 45-59 MIN: ICD-10-PCS | Mod: S$PBB,,, | Performed by: SURGERY

## 2022-12-06 PROCEDURE — 99999 PR PBB SHADOW E&M-EST. PATIENT-LVL IV: ICD-10-PCS | Mod: PBBFAC,,, | Performed by: SURGERY

## 2022-12-06 PROCEDURE — 3078F PR MOST RECENT DIASTOLIC BLOOD PRESSURE < 80 MM HG: ICD-10-PCS | Mod: CPTII,,, | Performed by: SURGERY

## 2022-12-06 PROCEDURE — 1160F PR REVIEW ALL MEDS BY PRESCRIBER/CLIN PHARMACIST DOCUMENTED: ICD-10-PCS | Mod: CPTII,,, | Performed by: SURGERY

## 2022-12-06 PROCEDURE — 99214 OFFICE O/P EST MOD 30 MIN: CPT | Mod: PBBFAC,PN | Performed by: SURGERY

## 2022-12-06 PROCEDURE — 1159F MED LIST DOCD IN RCRD: CPT | Mod: CPTII,,, | Performed by: SURGERY

## 2022-12-06 RX ORDER — MELOXICAM 7.5 MG/1
7.5 TABLET ORAL
COMMUNITY
Start: 2022-11-29 | End: 2023-09-13 | Stop reason: SDUPTHER

## 2022-12-06 RX ORDER — ATORVASTATIN CALCIUM 80 MG/1
80 TABLET, FILM COATED ORAL
COMMUNITY
Start: 2022-11-26

## 2022-12-06 RX ORDER — IBUPROFEN 800 MG/1
800 TABLET ORAL 3 TIMES DAILY
Qty: 30 TABLET | Refills: 0 | Status: SHIPPED | OUTPATIENT
Start: 2022-12-06 | End: 2023-10-03

## 2022-12-06 RX ORDER — ENOXAPARIN SODIUM 100 MG/ML
40 INJECTION SUBCUTANEOUS
Status: CANCELLED | OUTPATIENT
Start: 2022-12-06

## 2022-12-06 RX ORDER — METHIMAZOLE 10 MG/1
10 TABLET ORAL
COMMUNITY
Start: 2022-11-15

## 2022-12-06 NOTE — PROGRESS NOTES
History & Physical    SUBJECTIVE:     History of Present Illness:  Patient is a 42 y.o. female presents with abdominal pain for the past 6 days.    Went to the emergency department and CT and ultrasound showed a very large 3 cm gallstone however since her labs were normal she was sent home at that time.    Comes to my clinic today with pain having persisted over the last 6 days where she needs to take Tylenol to sleep.    I discussed with her my concerns that she may have cholecystitis and that we should get labs today and possibly urgent surgical intervention tomorrow.    Definitely do not recommend patient weight longer than and this week prior to any surgical intervention.      Chief Complaint   Patient presents with    Gall Bladder Problem       Review of patient's allergies indicates:  No Known Allergies    Current Outpatient Medications   Medication Sig Dispense Refill    atorvastatin (LIPITOR) 80 MG tablet Take 80 mg by mouth.      butalbital-acetaminophen-caffeine -40 mg (FIORICET, ESGIC) -40 mg per tablet TAKE ONE BY MOUTH EVERY DAY IF NEEDED FOR HEADACHE 30 tablet 2    cyclobenzaprine (FLEXERIL) 10 MG tablet Take 1 tablet (10 mg total) by mouth 3 (three) times daily as needed. 30 tablet 5    ezetimibe (ZETIA) 10 mg tablet Take 10 mg by mouth once daily.      furosemide (LASIX) 20 MG tablet Take 1 tablet (20 mg total) by mouth daily as needed. 30 tablet 5    gabapentin (NEURONTIN) 300 MG capsule One p.o. q.h.s. for restless leg syndrome 30 capsule 11    methIMAzole (TAPAZOLE) 10 MG Tab Take 10 mg by mouth.      ondansetron (ZOFRAN-ODT) 4 MG TbDL Take 1 tablet (4 mg total) by mouth 3 (three) times daily. 10 tablet 0    RANEXA 500 mg Tb12 Take 1 tablet by mouth 2 (two) times daily.  3    ibuprofen (ADVIL,MOTRIN) 800 MG tablet Take 1 tablet (800 mg total) by mouth every 6 (six) hours as needed for Pain. (Patient not taking: Reported on 12/6/2022) 20 tablet 0    ibuprofen (ADVIL,MOTRIN) 800 MG  tablet Take 1 tablet (800 mg total) by mouth 3 (three) times daily. 30 tablet 0    meloxicam (MOBIC) 7.5 MG tablet Take 7.5 mg by mouth.      sumatriptan (IMITREX) 100 MG tablet SMARTSI Tablet(s) By Mouth 1 to 2 Times Daily      topiramate (TOPAMAX) 50 MG tablet Take 50 mg by mouth nightly.      traMADoL (ULTRAM-ER) 100 MG Tb24 Take 1 tablet (100 mg total) by mouth once daily. (Patient not taking: Reported on 2022) 30 tablet 0     No current facility-administered medications for this visit.       Past Medical History:   Diagnosis Date    Abnormal EKG 2018    Angina pectoris 2018    Angina pectoris     Arthritis     Bipolar affective disorder     Chest pain 2018    Gallstones     GERD (gastroesophageal reflux disease)     Hyperlipidemia     Hyperlipidemia     Migraine      Past Surgical History:   Procedure Laterality Date    CARDIAC CATHETERIZATION  2018     SECTION      x3     SECTION       SECTION      x 3    KNEE ARTHROSCOPY      left    KNEE ARTHROSCOPY       Family History   Problem Relation Age of Onset    Bone cancer Mother     Stomach cancer Father      Social History     Tobacco Use    Smoking status: Every Day     Packs/day: 0.50     Types: Cigarettes    Smokeless tobacco: Never   Substance Use Topics    Alcohol use: No     Comment: 2 or 3 monthly    Drug use: No        Review of Systems:  Review of Systems   Constitutional:  Negative for appetite change, fatigue, fever and unexpected weight change.   HENT:  Negative for sore throat and trouble swallowing.    Eyes: Negative.    Respiratory:  Negative for cough, shortness of breath and wheezing.    Cardiovascular:  Negative for chest pain and leg swelling.   Gastrointestinal:  Positive for abdominal pain (Right upper quadrant, positive Gooden sign). Negative for abdominal distention, blood in stool, constipation, diarrhea, nausea and vomiting.   Endocrine: Negative.    Genitourinary: Negative.    Musculoskeletal:   "Negative for back pain.   Skin: Negative.  Negative for rash.   Allergic/Immunologic: Negative.    Neurological: Negative.    Hematological: Negative.    Psychiatric/Behavioral:  Negative for confusion.      OBJECTIVE:     Vital Signs (Most Recent)  Pulse: 62 (12/06/22 1511)  Resp: 18 (12/06/22 1511)  BP: 119/74 (12/06/22 1511)  5' 8" (1.727 m)  114.3 kg (251 lb 15.8 oz)     Physical Exam:  Physical Exam  Vitals and nursing note reviewed.   Constitutional:       Appearance: She is well-developed.   HENT:      Head: Normocephalic and atraumatic.   Cardiovascular:      Rate and Rhythm: Normal rate.      Heart sounds: Normal heart sounds.   Pulmonary:      Effort: Pulmonary effort is normal.   Abdominal:      General: Bowel sounds are normal. There is no distension.      Palpations: Abdomen is soft.      Tenderness: There is abdominal tenderness (right upper quadrant).   Musculoskeletal:         General: Normal range of motion.      Cervical back: Normal range of motion.   Skin:     General: Skin is warm and dry.      Capillary Refill: Capillary refill takes less than 2 seconds.   Neurological:      Mental Status: She is alert and oriented to person, place, and time.   Psychiatric:         Behavior: Behavior normal.     Laboratory  CBC: Reviewed  CMP: Reviewed    Diagnostic Results:  US: Reviewed  CT: Reviewed  Large gallstone    ASSESSMENT/PLAN:     42-year-old female with a very large gallstone, concern for cholecystitis    PLAN:Plan    I described the nature of the patient's pathology and the spectrum of disease presentation ranging from mild discomfort to acute cholecystitis or gallstone/necrotizing pancreatitis. Non-operative therapy was discussed, but the patient would require a strict non-fat diet and still this would not guarantee resolution of symptoms. I think surgery is in the patient's best interest given the severity of symptoms, and she is agreeable. I described the risks of the surgery including but not " limited to bleeding, infection, wound complications, injury to local structures including the common bile duct, bile leak, persistent abd pain, and potential need for further interventions. The patient demonstrated understanding of these risks and asked appropriate questions. A consent form was signed today, and the patient will be booked for surgery as laparoscopic cholecystectomy, possible open, possible intraoperative cholangiogram.   Recommend labs to be performed today   Surgery to be performed urgently for cholecystectomy.

## 2023-05-08 ENCOUNTER — TELEPHONE (OUTPATIENT)
Dept: PRIMARY CARE CLINIC | Facility: CLINIC | Age: 43
End: 2023-05-08
Payer: MEDICAID

## 2023-05-08 NOTE — TELEPHONE ENCOUNTER
----- Message from Silva Estrada sent at 5/8/2023  9:35 AM CDT -----  Contact: Pt [phone]  Requesting an RX refill or new RX.  Is this a refill or new RX: Refill  RX name and strength (copy/paste from chart):  diclofenac sodium (VOLTAREN) 1 % Gel   Is this a 30 day or 90 day RX: 30  Pharmacy name and phone # (copy/paste from chart):    [facility] - [facility] [address]  [address] [address]  Phone: [phone] Fax: [phone]    Comment: Patient is OUT!    Please call and advise.    Thank You

## 2023-05-11 RX ORDER — DICLOFENAC SODIUM 10 MG/G
2 GEL TOPICAL 4 TIMES DAILY
Qty: 200 G | Refills: 5 | Status: SHIPPED | OUTPATIENT
Start: 2023-05-11 | End: 2023-10-03

## 2023-05-12 ENCOUNTER — TELEPHONE (OUTPATIENT)
Dept: PRIMARY CARE CLINIC | Facility: CLINIC | Age: 43
End: 2023-05-12
Payer: MEDICAID

## 2023-05-12 NOTE — TELEPHONE ENCOUNTER
----- Message from Yee Jolly sent at 5/12/2023  4:02 PM CDT -----  Contact: [phone]  Pt is calling she states she had a call from the office please advise

## 2023-06-12 RX ORDER — CYCLOBENZAPRINE HCL 10 MG
TABLET ORAL
Qty: 30 TABLET | Refills: 5 | Status: SHIPPED | OUTPATIENT
Start: 2023-06-12 | End: 2023-09-13 | Stop reason: SDUPTHER

## 2023-06-12 RX ORDER — FUROSEMIDE 20 MG/1
20 TABLET ORAL DAILY PRN
Qty: 90 TABLET | Refills: 1 | Status: SHIPPED | OUTPATIENT
Start: 2023-06-12 | End: 2024-01-25

## 2023-06-12 NOTE — TELEPHONE ENCOUNTER
No care due was identified.  Health [facility] Embedded Care Due Messages. Reference number: [id number].   6/12/2023 11:03:16 AM CDT

## 2023-09-13 ENCOUNTER — OFFICE VISIT (OUTPATIENT)
Dept: PRIMARY CARE CLINIC | Facility: CLINIC | Age: 43
End: 2023-09-13
Payer: MEDICAID

## 2023-09-13 VITALS
OXYGEN SATURATION: 97 % | SYSTOLIC BLOOD PRESSURE: 124 MMHG | DIASTOLIC BLOOD PRESSURE: 80 MMHG | TEMPERATURE: 97 F | HEART RATE: 82 BPM | BODY MASS INDEX: 43.38 KG/M2 | HEIGHT: 68 IN | RESPIRATION RATE: 18 BRPM | WEIGHT: 286.25 LBS

## 2023-09-13 DIAGNOSIS — G43.009 MIGRAINE WITHOUT AURA AND WITHOUT STATUS MIGRAINOSUS, NOT INTRACTABLE: ICD-10-CM

## 2023-09-13 DIAGNOSIS — Z72.0 TOBACCO ABUSE: ICD-10-CM

## 2023-09-13 DIAGNOSIS — F31.9 BIPOLAR I DISORDER: ICD-10-CM

## 2023-09-13 DIAGNOSIS — G25.81 RESTLESS LEG SYNDROME: ICD-10-CM

## 2023-09-13 DIAGNOSIS — J02.9 PHARYNGITIS, UNSPECIFIED ETIOLOGY: ICD-10-CM

## 2023-09-13 DIAGNOSIS — Z90.49 HISTORY OF CHOLECYSTECTOMY: ICD-10-CM

## 2023-09-13 DIAGNOSIS — M25.473 ANKLE EDEMA: ICD-10-CM

## 2023-09-13 DIAGNOSIS — M77.11 LATERAL EPICONDYLITIS OF RIGHT ELBOW: ICD-10-CM

## 2023-09-13 DIAGNOSIS — E66.01 MORBID OBESITY WITH BMI OF 40.0-44.9, ADULT: ICD-10-CM

## 2023-09-13 DIAGNOSIS — E66.9 OBESITY (BMI 30-39.9): ICD-10-CM

## 2023-09-13 DIAGNOSIS — E78.5 HYPERLIPIDEMIA, UNSPECIFIED HYPERLIPIDEMIA TYPE: Primary | ICD-10-CM

## 2023-09-13 DIAGNOSIS — M25.512 ACUTE PAIN OF LEFT SHOULDER: ICD-10-CM

## 2023-09-13 PROBLEM — R10.13 ABDOMINAL PAIN, EPIGASTRIC: Status: RESOLVED | Noted: 2018-06-07 | Resolved: 2023-09-13

## 2023-09-13 PROBLEM — K80.20 CHOLELITHIASIS: Status: RESOLVED | Noted: 2022-12-01 | Resolved: 2023-09-13

## 2023-09-13 PROBLEM — K80.20 GALLSTONES: Status: RESOLVED | Noted: 2018-06-07 | Resolved: 2023-09-13

## 2023-09-13 PROBLEM — K83.8 DILATION OF COMMON BILE DUCT: Status: RESOLVED | Noted: 2022-12-01 | Resolved: 2023-09-13

## 2023-09-13 LAB
CTP QC/QA: YES
S PYO RRNA THROAT QL PROBE: NEGATIVE

## 2023-09-13 PROCEDURE — 99213 OFFICE O/P EST LOW 20 MIN: CPT | Mod: PBBFAC,PN | Performed by: FAMILY MEDICINE

## 2023-09-13 PROCEDURE — 99214 OFFICE O/P EST MOD 30 MIN: CPT | Mod: S$PBB,,, | Performed by: FAMILY MEDICINE

## 2023-09-13 PROCEDURE — 99999 PR PBB SHADOW E&M-EST. PATIENT-LVL III: CPT | Mod: PBBFAC,,, | Performed by: FAMILY MEDICINE

## 2023-09-13 PROCEDURE — 99999PBSHW PR PBB SHADOW TECHNICAL ONLY FILED TO HB: ICD-10-PCS | Mod: PBBFAC,,,

## 2023-09-13 PROCEDURE — 3008F PR BODY MASS INDEX (BMI) DOCUMENTED: ICD-10-PCS | Mod: CPTII,,, | Performed by: FAMILY MEDICINE

## 2023-09-13 PROCEDURE — 3079F DIAST BP 80-89 MM HG: CPT | Mod: CPTII,,, | Performed by: FAMILY MEDICINE

## 2023-09-13 PROCEDURE — 96372 THER/PROPH/DIAG INJ SC/IM: CPT | Mod: PBBFAC,PN

## 2023-09-13 PROCEDURE — 3008F BODY MASS INDEX DOCD: CPT | Mod: CPTII,,, | Performed by: FAMILY MEDICINE

## 2023-09-13 PROCEDURE — 3044F HG A1C LEVEL LT 7.0%: CPT | Mod: CPTII,,, | Performed by: FAMILY MEDICINE

## 2023-09-13 PROCEDURE — 99999PBSHW POCT RAPID STREP A: Mod: PBBFAC,,,

## 2023-09-13 PROCEDURE — 3074F SYST BP LT 130 MM HG: CPT | Mod: CPTII,,, | Performed by: FAMILY MEDICINE

## 2023-09-13 PROCEDURE — 3044F PR MOST RECENT HEMOGLOBIN A1C LEVEL <7.0%: ICD-10-PCS | Mod: CPTII,,, | Performed by: FAMILY MEDICINE

## 2023-09-13 PROCEDURE — 99214 PR OFFICE/OUTPT VISIT, EST, LEVL IV, 30-39 MIN: ICD-10-PCS | Mod: S$PBB,,, | Performed by: FAMILY MEDICINE

## 2023-09-13 PROCEDURE — 3079F PR MOST RECENT DIASTOLIC BLOOD PRESSURE 80-89 MM HG: ICD-10-PCS | Mod: CPTII,,, | Performed by: FAMILY MEDICINE

## 2023-09-13 PROCEDURE — 87880 STREP A ASSAY W/OPTIC: CPT | Mod: PBBFAC,PN | Performed by: FAMILY MEDICINE

## 2023-09-13 PROCEDURE — 99999PBSHW PR PBB SHADOW TECHNICAL ONLY FILED TO HB: Mod: PBBFAC,,,

## 2023-09-13 PROCEDURE — 99999 PR PBB SHADOW E&M-EST. PATIENT-LVL III: ICD-10-PCS | Mod: PBBFAC,,, | Performed by: FAMILY MEDICINE

## 2023-09-13 PROCEDURE — 3074F PR MOST RECENT SYSTOLIC BLOOD PRESSURE < 130 MM HG: ICD-10-PCS | Mod: CPTII,,, | Performed by: FAMILY MEDICINE

## 2023-09-13 RX ORDER — CYCLOBENZAPRINE HCL 10 MG
TABLET ORAL
Qty: 30 TABLET | Refills: 5 | Status: SHIPPED | OUTPATIENT
Start: 2023-09-13

## 2023-09-13 RX ORDER — TRAMADOL HYDROCHLORIDE 100 MG/1
100 TABLET, EXTENDED RELEASE ORAL DAILY
Qty: 30 TABLET | Refills: 0 | Status: SHIPPED | OUTPATIENT
Start: 2023-09-13

## 2023-09-13 RX ORDER — TRIAMCINOLONE ACETONIDE 40 MG/ML
40 INJECTION, SUSPENSION INTRA-ARTICULAR; INTRAMUSCULAR ONCE
Status: COMPLETED | OUTPATIENT
Start: 2023-09-13 | End: 2023-09-13

## 2023-09-13 RX ORDER — PREDNISONE 20 MG/1
20 TABLET ORAL DAILY
Qty: 10 TABLET | Refills: 0 | Status: SHIPPED | OUTPATIENT
Start: 2023-09-13 | End: 2024-01-24 | Stop reason: SDUPTHER

## 2023-09-13 RX ORDER — MELOXICAM 7.5 MG/1
TABLET ORAL
Qty: 60 TABLET | Refills: 5 | Status: SHIPPED | OUTPATIENT
Start: 2023-09-13 | End: 2023-10-03

## 2023-09-13 RX ORDER — CEFDINIR 300 MG/1
300 CAPSULE ORAL 2 TIMES DAILY
Qty: 20 CAPSULE | Refills: 0 | Status: SHIPPED | OUTPATIENT
Start: 2023-09-13 | End: 2023-09-23

## 2023-09-13 RX ADMIN — TRIAMCINOLONE ACETONIDE 40 MG: 40 INJECTION, SUSPENSION INTRA-ARTICULAR; INTRAMUSCULAR at 04:09

## 2023-09-13 NOTE — PROGRESS NOTES
Subjective:       Patient ID: Lennie Amor is a 43 y.o. female.    Chief Complaint: Shoulder Pain (2 month )      HPI:  43-year-old white female --left shoulder --started 2 months ago--patient does a lot of cramping--pain especially if lays on her left shoulder or goes to raise left arm overhead or trying to lift things rotation of the shoulders fine but has pain in the acromioclavicular area near the rotator cuff.  No prior shoulder injury-did have some problems with the right elbow has improved still some occasional soreness.  No lupus rheumatoid gout  After visit complete patient complaining of pharyngitis states granddaughter had strep ordered strep screen will treat with Omnicef        ROS:   Skin: no psoriasis, eczema, skin cancer  HEENT:  +history migraine headache--good since patient is on medication,no  ocular pain, blurred vision, diplopia, epistaxis, hoarseness change in voice, +hypothyroid  Lung: No pneumonia, asthma, Tb, wheezing, SOB, +smoking half pack per day  Heart: No chest pain, +ankle edemaon lasix , no palpitations, MI, peter murmur, hypertens cardiologist Dr. Carter ion,+ hyperlipidemia--no stent bypass arrhythmia Hx angina on ranexa .  Schedule for echocardiogram next month--  Abdomen: No nausea, vomiting, diarrhea, constipation, ulcers, hepatitis, gallbladder disease, melena, hematochezia, hematemesis + history cholecystectomy  : no UTI, renal disease, stones   GYN LMP 08/21/2023--last mammogram about a year  MS: no fractures, O/A, lupus, rheumatoid, gout history of osteoarthritis history of right knee pain history of lateral epicondylitis better now with left shoulder pain see history of present illness  Neuro: No dizziness, LOC, seizures   No diabetes, no anemia, no anxiety, no depression     3 children work  lives alone     Objective:   Physical Exam:  General: Well nourished, well developed, no acute distress  Skin: No lesions  HEENT: Eyes PERRLA, EOM  intact, nose patent, throat non-erythematous erythema right TM    NECK: Supple, no bruits, No JVD, no nodes  Lungs: Clear, no rales, rhonchi, wheezing  Heart: Regular rate and rhythm, no murmurs, gallops, or rubs  Abdomen: flat, bowel sounds positive, no tenderness, or organomegaly  MS:  Pain approximately 2 cm above the right elbow circumferential in 2 cm below the elbow tenderness in the right lateral epicondyle area pain with flexion and extension   Neuro: Alert, CN intact, oriented X 3  Extremities: No cyanosis, clubbing, or edema         Assessment:       1. Hyperlipidemia, unspecified hyperlipidemia type    2. Lateral epicondylitis of right elbow    3. Acute pain of left shoulder    4. Migraine without aura and without status migrainosus, not intractable    5. Bipolar I disorder    6. Restless leg syndrome    7. Obesity (BMI 30-39.9)    8. Morbid obesity with BMI of 40.0-44.9, adult    9. History of cholecystectomy    10. Ankle edema    11. Tobacco abuse        Plan:       Hyperlipidemia, unspecified hyperlipidemia type    Lateral epicondylitis of right elbow  -     traMADoL (ULTRAM-ER) 100 MG Tb24; Take 1 tablet (100 mg total) by mouth once daily.  Dispense: 30 tablet; Refill: 0    Acute pain of left shoulder  -     X-Ray Shoulder Trauma 3 view Left; Future; Expected date: 09/13/2023    Migraine without aura and without status migrainosus, not intractable    Bipolar I disorder    Restless leg syndrome    Obesity (BMI 30-39.9)    Morbid obesity with BMI of 40.0-44.9, adult    History of cholecystectomy    Ankle edema    Tobacco abuse    Other orders  -     triamcinolone acetonide injection 40 mg  -     predniSONE (DELTASONE) 20 MG tablet; Take 1 tablet (20 mg total) by mouth once daily.  Dispense: 10 tablet; Refill: 0  -     cyclobenzaprine (FLEXERIL) 10 MG tablet; 1 p.o. q.h.s. p.r.n. left shoulder pain or muscle spasm can increase to t.i.d. if needed but will make sleepy  Dispense: 30 tablet; Refill: 5  -      meloxicam (MOBIC) 7.5 MG tablet; After prednisone dose complete start Mobic 7.51 p.o. b.i.d. for left shoulder pain do not take with any other NSAIDs can take with Tylenol  Dispense: 60 tablet; Refill: 5        Main Reason for Visit  Left shoulder pain--especially if lays on the left shoulder tries to raise arm overhead or lift things--x-ray left shoulder---Kenalog/Ultram/prednisone 20 mg q.d. times 10 days/Flexeril if needed as a muscle relaxer at night Moist heat/theragesic or Tiger balm/range of motion exercise  Right lateral epicondylitis--better   Restless leg syndrome trial of gabapentin 300 mg 1 p.o. q.h.s. do not take with Ultram--legs cramping up waking patient up at night  History migraine headaches on Fioricet--Imitrex Topamax  Hypothyroidism patient on Tapazole  Tobacco abuse Needs to DC smoking  Ankle edema on Lasix 20  History cholecystectomy  History bipolar  Hyperlipidemia on Lipitor   Hx angina on ranexa --has appt DR Carter for echocardiogram  Lab done in June has Lab next week with Dr. Carter  Health maintenance hepatitis C HIV  Patient claims did lab with Dr. Carter

## 2023-09-13 NOTE — PROGRESS NOTES
Verified pt by name and . NKDA. Per physician orders pt was administered Kenalog 40 mg/mL  IM to left upper outer gluteus using aseptic technique. Pt tolerated well. No adverse effects or pain reported. MD notified.

## 2023-09-15 RX ORDER — GABAPENTIN 300 MG/1
CAPSULE ORAL
Qty: 90 CAPSULE | Refills: 3 | Status: SHIPPED | OUTPATIENT
Start: 2023-09-15

## 2023-09-15 NOTE — TELEPHONE ENCOUNTER
No care due was identified.  Health [facility] Embedded Care Due Messages. Reference number: [id number].   9/15/2023 11:21:47 AM CDT

## 2023-09-18 ENCOUNTER — PATIENT MESSAGE (OUTPATIENT)
Dept: PRIMARY CARE CLINIC | Facility: CLINIC | Age: 43
End: 2023-09-18
Payer: MEDICAID

## 2023-10-03 RX ORDER — MELOXICAM 7.5 MG/1
TABLET ORAL
Qty: 60 TABLET | Refills: 5 | Status: SHIPPED | OUTPATIENT
Start: 2023-10-03

## 2023-10-18 ENCOUNTER — PATIENT MESSAGE (OUTPATIENT)
Dept: CARDIOLOGY | Facility: CLINIC | Age: 43
End: 2023-10-18
Payer: MEDICAID

## 2024-01-23 ENCOUNTER — TELEPHONE (OUTPATIENT)
Dept: PRIMARY CARE CLINIC | Facility: CLINIC | Age: 44
End: 2024-01-23
Payer: MEDICAID

## 2024-01-23 NOTE — TELEPHONE ENCOUNTER
----- Message from Yee Jolly sent at 1/23/2024  9:47 AM CST -----  Contact: [phone]  Symptom: Sinus Symptoms  Outcome: Schedule an appointment to be seen within 24 hours.  Reason: Caller denied all higher acuity questions    The caller accepted this outcome    Pt states she has a cough and sinus issues and she states the cough is really bad   Please advise this is due the symptom

## 2024-01-24 ENCOUNTER — OFFICE VISIT (OUTPATIENT)
Dept: PRIMARY CARE CLINIC | Facility: CLINIC | Age: 44
End: 2024-01-24
Payer: MEDICAID

## 2024-01-24 DIAGNOSIS — Z90.49 HISTORY OF CHOLECYSTECTOMY: ICD-10-CM

## 2024-01-24 DIAGNOSIS — E78.00 PURE HYPERCHOLESTEROLEMIA: ICD-10-CM

## 2024-01-24 DIAGNOSIS — Z72.0 TOBACCO ABUSE: ICD-10-CM

## 2024-01-24 DIAGNOSIS — J40 BRONCHITIS: ICD-10-CM

## 2024-01-24 DIAGNOSIS — G43.109 MIGRAINE WITH AURA AND WITHOUT STATUS MIGRAINOSUS, NOT INTRACTABLE: ICD-10-CM

## 2024-01-24 DIAGNOSIS — E66.01 MORBID OBESITY WITH BMI OF 40.0-44.9, ADULT: ICD-10-CM

## 2024-01-24 DIAGNOSIS — J01.00 ACUTE NON-RECURRENT MAXILLARY SINUSITIS: Primary | ICD-10-CM

## 2024-01-24 DIAGNOSIS — F31.9 BIPOLAR I DISORDER: ICD-10-CM

## 2024-01-24 PROCEDURE — 99213 OFFICE O/P EST LOW 20 MIN: CPT | Mod: 95,,, | Performed by: FAMILY MEDICINE

## 2024-01-24 RX ORDER — PROMETHAZINE HYDROCHLORIDE AND DEXTROMETHORPHAN HYDROBROMIDE 6.25; 15 MG/5ML; MG/5ML
5 SYRUP ORAL EVERY 6 HOURS PRN
Qty: 180 ML | Refills: 1 | Status: SHIPPED | OUTPATIENT
Start: 2024-01-24 | End: 2024-04-29

## 2024-01-24 RX ORDER — CEFDINIR 300 MG/1
300 CAPSULE ORAL 2 TIMES DAILY
Qty: 20 CAPSULE | Refills: 0 | Status: SHIPPED | OUTPATIENT
Start: 2024-01-24 | End: 2024-02-03

## 2024-01-24 RX ORDER — PREDNISONE 20 MG/1
20 TABLET ORAL DAILY
Qty: 10 TABLET | Refills: 0 | Status: SHIPPED | OUTPATIENT
Start: 2024-01-24 | End: 2024-04-29

## 2024-01-24 NOTE — PROGRESS NOTES
Subjective:       Patient ID: Lennie Amor is a 43 y.o. female.    Chief Complaint: No chief complaint on file.      HPI: Virtual Video Telehealth Visit    The patient location is:  Home  The chief complaint leading to consultation is:  Sinusitis/bronchitis bipolar Graves disease hyperlipidemia migraine headache  Visit type: Virtual visit  Total time spent with patient:  20 minutes     Each patient to whom I provide medical services by telemedicine is:  (1) informed of the relationship between the physician and patient and the respective role of any other health care provider with respect to management of the patient; and (2) notified that they may decline to receive medical services by telemedicine and may withdraw from such care at any time. Patient verbally consented to receive this service via voice-only telephone call.       HPI:  See history of present illness above     Assessment and plan:  See plan below 42 yo WF in for cough --started Thursday 6 days ago--no fever--+runny stuffy nose--no sore throat--+cough started Saturday--+phlegm occasionally but usually a dry cough.  No pneumonia asthma T B---+smoking 1/2 ppd --no wheezing.  No nausea vomiting diarrhea.  Had covid vaccine .  No one sick at home-patient works from home--LMP Tuse   Occasional headaches secondary to coughing so much  No recent antibiotics--no recent steroids        ROS:   Skin: no psoriasis, eczema, skin cancer  HEENT:  +history migraine headache--good since patient is on medication,no  ocular pain, blurred vision, diplopia, epistaxis, hoarseness change in voice, +hypothyroid  Lung: No pneumonia, asthma, Tb, wheezing, SOB, +smoking half pack per day  Heart: No chest pain, +ankle edemaon lasix , no palpitations, MI, peter murmur, hypertens cardiologist Dr. Carter ion,+ hyperlipidemia--no stent bypass arrhythmia Hx angina on ranexa .  Schedule for echocardiogram next month--  Abdomen: No nausea, vomiting, diarrhea, constipation,  ulcers, hepatitis,melena, hematochezia, hematemesis + history cholecystectomy  : no UTI, renal disease, stones   GYN LMP just ended-last mammogram about a year  MS: no fractures, O/A, lupus, rheumatoid, gout history of osteoarthritis history of right knee pain history of lateral epicondylitis better now with left shoulder pain - all of it better   Neuro: No dizziness, LOC, seizures   No diabetes, no anemia, no anxiety, no depression     3 children work  lives alone     Objective:   Physical Exam:  General: Well nourished, well developed, no acute distress  Skin: No lesions  HEENT: Eyes PERRLA, EOM intact, nose patent, throat non-erythematous erythema right TM    NECK: Supple, no bruits, No JVD, no nodes  Lungs: Clear, no rales, rhonchi, wheezing  Heart: Regular rate and rhythm, no murmurs, gallops, or rubs  Abdomen: flat, bowel sounds positive, no tenderness, or organomegaly  MS:  Pain approximately 2 cm above the right elbow circumferential in 2 cm below the elbow tenderness in the right lateral epicondyle area pain with flexion and extension   Neuro: Alert, CN intact, oriented X 3  Extremities: No cyanosis, clubbing, or edema         Assessment:       1. Acute non-recurrent maxillary sinusitis    2. Bronchitis    3. Migraine with aura and without status migrainosus, not intractable    4. Bipolar I disorder    5. Pure hypercholesterolemia    6. Morbid obesity with BMI of 40.0-44.9, adult    7. History of cholecystectomy    8. Tobacco abuse          Plan:       Acute non-recurrent maxillary sinusitis    Bronchitis    Migraine with aura and without status migrainosus, not intractable    Bipolar I disorder  -     CBC Auto Differential; Future; Expected date: 01/24/2024  -     Comprehensive Metabolic Panel; Future; Expected date: 01/24/2024  -     T4, Free; Future; Expected date: 01/24/2024  -     TSH; Future; Expected date: 01/24/2024    Pure hypercholesterolemia  -     Lipid Panel; Future;  Expected date: 01/24/2024    Morbid obesity with BMI of 40.0-44.9, adult    History of cholecystectomy    Tobacco abuse    Other orders  -     cefdinir (OMNICEF) 300 MG capsule; Take 1 capsule (300 mg total) by mouth 2 (two) times daily. for 10 days  Dispense: 20 capsule; Refill: 0  -     promethazine-dextromethorphan (PROMETHAZINE-DM) 6.25-15 mg/5 mL Syrp; Take 5 mLs by mouth every 6 (six) hours as needed (cough).  Dispense: 180 mL; Refill: 1  -     predniSONE (DELTASONE) 20 MG tablet; Take 1 tablet (20 mg total) by mouth once daily.  Dispense: 10 tablet; Refill: 0          Main Reason for Visit  Upper respiratory infection--sinusitis/bronchitis--sinusitis the worse--x1 week---Omnicef 300 mg 1 p.o. b.i.d. times 10 days/prednisone taper for pills q.d. x2 days 3 pills q.d. x2 days 2 pills q.d. x2 days 1 p.o. q.d. x2 days Phenergan DM 1 tsp q.6 hours p.r.n. cough if not better in a week will get chest x-ray and possibly Lab  Left shoulder pain--better    Restless leg syndrome trial of gabapentin 300 mg 1 p.o. q.h.s. do not take with Ultram--legs cramping up waking patient up at night  History migraine headaches on Fioricet--Imitrex Topamax  Hypothyroidism patient on Tapazole  Tobacco abuse Needs to DC smoking  Ankle edema on Lasix 20  History cholecystectomy  History bipolar  Hyperlipidemia on Lipitor   Hx angina on ranexa --has appt DR Carter for echocardiogram  Lab done in June --needs lab CBCs CMP lipids T4 TSH  Health maintenance hepatitis C HIV  Patient claims did lab with Dr. Carter

## 2024-01-25 RX ORDER — DICLOFENAC SODIUM 10 MG/G
2 GEL TOPICAL 4 TIMES DAILY
Qty: 200 G | Refills: 5 | OUTPATIENT
Start: 2024-01-25

## 2024-01-25 RX ORDER — FUROSEMIDE 20 MG/1
20 TABLET ORAL DAILY PRN
Qty: 90 TABLET | Refills: 1 | Status: SHIPPED | OUTPATIENT
Start: 2024-01-25

## 2024-01-25 NOTE — TELEPHONE ENCOUNTER
No care due was identified.  [facility] Embedded Care Due Messages. Reference number: [id number].   1/25/2024 12:59:43 PM CST

## 2024-01-25 NOTE — TELEPHONE ENCOUNTER
Refill Decision Note   Lennie Amor  is requesting a refill authorization.  Brief Assessment and Rationale for Refill:  Approve  Quick Discontinue     Medication Therapy Plan:         Comments:     Note composed:1:20 PM 01/25/2024

## 2024-02-15 PROBLEM — Z11.3 SCREENING EXAMINATION FOR STD (SEXUALLY TRANSMITTED DISEASE): Status: ACTIVE | Noted: 2024-02-15

## 2024-02-16 ENCOUNTER — TELEPHONE (OUTPATIENT)
Dept: PRIMARY CARE CLINIC | Facility: CLINIC | Age: 44
End: 2024-02-16
Payer: MEDICAID

## 2024-02-16 RX ORDER — METRONIDAZOLE 500 MG/1
500 TABLET ORAL
COMMUNITY
Start: 2024-02-16 | End: 2024-04-29

## 2024-02-16 NOTE — TELEPHONE ENCOUNTER
----- Message from Thomas Egan sent at 2/16/2024  1:20 PM CST -----  Contact: Pt  [phone]  Pt called in regards to she states a medication was to be called in to her pharmacy on today for an infection so far nothing was sent over to her pharmacy please advise.    [facility] - [facility] [address] [place] [address]   Phone: [phone]  Fax: [phone]

## 2024-02-16 NOTE — TELEPHONE ENCOUNTER
Returned call to patient regarding message. Patient said that she called the pharmacy and no medication was there. I informed patient that I will call pharmacy and call in medication under doctors direction patient verbalized understand. I called [facility] and called in medication that provider said which was Flagyl 500 mg 1 by mouth twice day for 7 days quantity of 14. Pharmacy verbalized understand.

## 2024-04-17 DIAGNOSIS — M25.561 RIGHT KNEE PAIN: Primary | ICD-10-CM

## 2024-04-19 ENCOUNTER — HOSPITAL ENCOUNTER (OUTPATIENT)
Dept: RADIOLOGY | Facility: HOSPITAL | Age: 44
Discharge: HOME | End: 2024-04-19
Attending: ORTHOPAEDIC SURGERY
Payer: MEDICAID

## 2024-04-19 ENCOUNTER — OFFICE VISIT (OUTPATIENT)
Dept: ORTHOPEDICS | Facility: CLINIC | Age: 44
End: 2024-04-19
Payer: MEDICAID

## 2024-04-19 VITALS — BODY MASS INDEX: 41.67 KG/M2 | WEIGHT: 274.94 LBS | HEIGHT: 68 IN

## 2024-04-19 DIAGNOSIS — M25.561 RIGHT KNEE PAIN, UNSPECIFIED CHRONICITY: Primary | ICD-10-CM

## 2024-04-19 DIAGNOSIS — M17.11 OSTEOARTHRITIS OF RIGHT KNEE, UNSPECIFIED OSTEOARTHRITIS TYPE: ICD-10-CM

## 2024-04-19 DIAGNOSIS — M25.561 RIGHT KNEE PAIN: ICD-10-CM

## 2024-04-19 PROCEDURE — 99213 OFFICE O/P EST LOW 20 MIN: CPT | Mod: PBBFAC,25,PO | Performed by: ORTHOPAEDIC SURGERY

## 2024-04-19 PROCEDURE — 20610 DRAIN/INJ JOINT/BURSA W/O US: CPT | Mod: PBBFAC,PO | Performed by: ORTHOPAEDIC SURGERY

## 2024-04-19 PROCEDURE — 99999 PR PBB SHADOW E&M-EST. PATIENT-LVL III: CPT | Mod: PBBFAC,,, | Performed by: ORTHOPAEDIC SURGERY

## 2024-04-19 PROCEDURE — 1159F MED LIST DOCD IN RCRD: CPT | Mod: CPTII,,, | Performed by: ORTHOPAEDIC SURGERY

## 2024-04-19 PROCEDURE — 73560 X-RAY EXAM OF KNEE 1 OR 2: CPT | Mod: 26,59,LT, | Performed by: RADIOLOGY

## 2024-04-19 PROCEDURE — 73560 X-RAY EXAM OF KNEE 1 OR 2: CPT | Mod: TC,PO,LT

## 2024-04-19 PROCEDURE — 3008F BODY MASS INDEX DOCD: CPT | Mod: CPTII,,, | Performed by: ORTHOPAEDIC SURGERY

## 2024-04-19 PROCEDURE — 97760 ORTHOTIC MGMT&TRAING 1ST ENC: CPT | Mod: S$PBB,,, | Performed by: ORTHOPAEDIC SURGERY

## 2024-04-19 PROCEDURE — 99204 OFFICE O/P NEW MOD 45 MIN: CPT | Mod: 25,S$PBB,, | Performed by: ORTHOPAEDIC SURGERY

## 2024-04-19 PROCEDURE — 73562 X-RAY EXAM OF KNEE 3: CPT | Mod: 26,RT,, | Performed by: RADIOLOGY

## 2024-04-19 PROCEDURE — 73562 X-RAY EXAM OF KNEE 3: CPT | Mod: TC,PO,RT

## 2024-04-19 PROCEDURE — 99999PBSHW PR PBB SHADOW TECHNICAL ONLY FILED TO HB: Mod: PBBFAC,,,

## 2024-04-19 RX ORDER — TRIAMCINOLONE ACETONIDE 40 MG/ML
40 INJECTION, SUSPENSION INTRA-ARTICULAR; INTRAMUSCULAR
Status: DISCONTINUED | OUTPATIENT
Start: 2024-04-19 | End: 2024-04-19 | Stop reason: HOSPADM

## 2024-04-19 RX ADMIN — TRIAMCINOLONE ACETONIDE 40 MG: 40 INJECTION, SUSPENSION INTRA-ARTICULAR; INTRAMUSCULAR at 10:04

## 2024-04-19 NOTE — PROCEDURES
Large Joint Aspiration/Injection: R knee    Date/Time: 4/19/2024 10:00 AM    Performed by: Sung Fulton MD  Authorized by: Sung Fulton MD    Consent Done?:  Yes (Verbal)  Timeout: prior to procedure the correct patient, procedure, and site was verified    Prep: patient was prepped and draped in usual sterile fashion      Details:  Needle Size:  21 G  Approach:  Anterolateral  Location:  Knee  Site:  R knee  Medications:  40 mg triamcinolone acetonide 40 mg/mL  Patient tolerance:  Patient tolerated the procedure well with no immediate complications

## 2024-04-19 NOTE — PROGRESS NOTES
43 years old right knee pain for years off and on.  We would diagnosed her with patellar instability about 7 years ago been using a brace.  Patient reports that she has had 1 episode of her patella coming out of place since we saw her 7 years ago.  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 The patient demonstrated understanding and proper care. This was performed for 15 minutes.

## 2024-04-22 DIAGNOSIS — M77.11 LATERAL EPICONDYLITIS OF RIGHT ELBOW: ICD-10-CM

## 2024-04-23 RX ORDER — MELOXICAM 7.5 MG/1
TABLET ORAL
Qty: 60 TABLET | Refills: 5 | Status: SHIPPED | OUTPATIENT
Start: 2024-04-23 | End: 2024-04-29 | Stop reason: SDUPTHER

## 2024-04-23 RX ORDER — TRAMADOL HYDROCHLORIDE 100 MG/1
100 TABLET, EXTENDED RELEASE ORAL DAILY
Qty: 30 TABLET | Refills: 0 | OUTPATIENT
Start: 2024-04-23

## 2024-04-23 RX ORDER — CYCLOBENZAPRINE HCL 10 MG
TABLET ORAL
Qty: 30 TABLET | Refills: 5 | Status: SHIPPED | OUTPATIENT
Start: 2024-04-23 | End: 2024-04-29 | Stop reason: SDUPTHER

## 2024-04-29 ENCOUNTER — OFFICE VISIT (OUTPATIENT)
Dept: PRIMARY CARE CLINIC | Facility: CLINIC | Age: 44
End: 2024-04-29
Payer: MEDICAID

## 2024-04-29 VITALS
HEART RATE: 72 BPM | SYSTOLIC BLOOD PRESSURE: 122 MMHG | BODY MASS INDEX: 41.87 KG/M2 | OXYGEN SATURATION: 98 % | HEIGHT: 68 IN | RESPIRATION RATE: 18 BRPM | DIASTOLIC BLOOD PRESSURE: 80 MMHG | WEIGHT: 276.25 LBS

## 2024-04-29 DIAGNOSIS — T78.40XD ALLERGY, SUBSEQUENT ENCOUNTER: Primary | ICD-10-CM

## 2024-04-29 DIAGNOSIS — H02.60 XANTHELASMA: ICD-10-CM

## 2024-04-29 DIAGNOSIS — E78.00 PURE HYPERCHOLESTEROLEMIA: ICD-10-CM

## 2024-04-29 DIAGNOSIS — G25.81 RESTLESS LEG SYNDROME: ICD-10-CM

## 2024-04-29 DIAGNOSIS — E66.01 MORBID OBESITY WITH BMI OF 40.0-44.9, ADULT: ICD-10-CM

## 2024-04-29 DIAGNOSIS — M77.11 LATERAL EPICONDYLITIS OF RIGHT ELBOW: ICD-10-CM

## 2024-04-29 DIAGNOSIS — M17.11 OSTEOARTHRITIS OF RIGHT KNEE, UNSPECIFIED OSTEOARTHRITIS TYPE: ICD-10-CM

## 2024-04-29 DIAGNOSIS — Z72.0 TOBACCO ABUSE: ICD-10-CM

## 2024-04-29 DIAGNOSIS — Z12.31 ENCOUNTER FOR SCREENING MAMMOGRAM FOR MALIGNANT NEOPLASM OF BREAST: ICD-10-CM

## 2024-04-29 PROBLEM — M25.521 RIGHT ELBOW PAIN: Status: RESOLVED | Noted: 2021-12-21 | Resolved: 2024-04-29

## 2024-04-29 PROBLEM — T78.40XA ALLERGIES: Status: ACTIVE | Noted: 2024-04-29

## 2024-04-29 PROCEDURE — 3074F SYST BP LT 130 MM HG: CPT | Mod: CPTII,,, | Performed by: FAMILY MEDICINE

## 2024-04-29 PROCEDURE — 1159F MED LIST DOCD IN RCRD: CPT | Mod: CPTII,,, | Performed by: FAMILY MEDICINE

## 2024-04-29 PROCEDURE — 3008F BODY MASS INDEX DOCD: CPT | Mod: CPTII,,, | Performed by: FAMILY MEDICINE

## 2024-04-29 PROCEDURE — 3079F DIAST BP 80-89 MM HG: CPT | Mod: CPTII,,, | Performed by: FAMILY MEDICINE

## 2024-04-29 PROCEDURE — 99214 OFFICE O/P EST MOD 30 MIN: CPT | Mod: S$PBB,,, | Performed by: FAMILY MEDICINE

## 2024-04-29 PROCEDURE — 99999 PR PBB SHADOW E&M-EST. PATIENT-LVL IV: CPT | Mod: PBBFAC,,, | Performed by: FAMILY MEDICINE

## 2024-04-29 PROCEDURE — 99214 OFFICE O/P EST MOD 30 MIN: CPT | Mod: PBBFAC,PN | Performed by: FAMILY MEDICINE

## 2024-04-29 RX ORDER — MELOXICAM 7.5 MG/1
TABLET ORAL
Qty: 60 TABLET | Refills: 5 | Status: SHIPPED | OUTPATIENT
Start: 2024-04-29 | End: 2024-05-12

## 2024-04-29 RX ORDER — TRAMADOL HYDROCHLORIDE 100 MG/1
100 TABLET, EXTENDED RELEASE ORAL DAILY
Qty: 30 TABLET | Refills: 0 | Status: SHIPPED | OUTPATIENT
Start: 2024-04-29

## 2024-04-29 RX ORDER — AZITHROMYCIN 250 MG/1
TABLET, FILM COATED ORAL
Qty: 6 TABLET | Refills: 0 | Status: SHIPPED | OUTPATIENT
Start: 2024-04-29 | End: 2024-05-03

## 2024-04-29 RX ORDER — PREDNISONE 20 MG/1
20 TABLET ORAL DAILY
Qty: 7 TABLET | Refills: 0 | Status: SHIPPED | OUTPATIENT
Start: 2024-04-29

## 2024-04-29 RX ORDER — PROMETHAZINE HYDROCHLORIDE AND DEXTROMETHORPHAN HYDROBROMIDE 6.25; 15 MG/5ML; MG/5ML
5 SYRUP ORAL EVERY 6 HOURS PRN
Qty: 180 ML | Refills: 1 | Status: SHIPPED | OUTPATIENT
Start: 2024-04-29

## 2024-04-29 RX ORDER — CYCLOBENZAPRINE HCL 10 MG
TABLET ORAL
Qty: 30 TABLET | Refills: 5 | Status: SHIPPED | OUTPATIENT
Start: 2024-04-29

## 2024-04-29 NOTE — PROGRESS NOTES
Subjective:       Patient ID: Lennie Amor is a 43 y.o. female.    Chief Complaint: Medication Refill and Allergies      HPI:42 yo WF in for refills and allergies---2 days ago had allergy problems---+runny stuffy nose--watery eyes--slight sore throat--+cough--dry  No pneumonia asthma TB---+smoking half pack per day---no wheezing nausea vomiting diarrhea  No recent antibiotics or steroids  Patient needs refills  History hyperlipidemia on atorvastatin and Zetia  History of unstable angina on Ranexa--sees Dr. Carter  History hypothyroidism on Tapazole  History of peripheral edema intermittent Lasix  Right knee pain patient on Ultram/Mobic/Flexeril  History restless leg syndrome on gabapentin              ROS:   Skin: no psoriasis, eczema, skin cancer  HEENT:  +history migraine headache--good since patient is on medication,no  ocular pain, blurred vision, diplopia, epistaxis, hoarseness change in voice, +hypothyroid  Lung: No pneumonia, asthma, Tb, wheezing, SOB, +smoking half pack per day  Heart: No chest pain, +ankle edemaon lasix , no palpitations, MI, peter murmur, hypertens cardiologist Dr. Carter ion,+ hyperlipidemia--no stent bypass arrhythmia Hx angina on ranexa .    Abdomen: No nausea, vomiting, diarrhea, constipation, ulcers, hepatitis,melena, hematochezia, hematemesis + history cholecystectomy  : no UTI, renal disease, stones   GYN LMP 4- -last mammogram about a year  MS: no fractures, O/A, lupus, rheumatoid, gout history of osteoarthritis history of right knee pain starts rehab May 7, 2024 after completing therapy if no better patient will have a right knee replacement   Neuro: No dizziness, LOC, seizures   No diabetes, no anemia, no anxiety, no depression     3 children work  lives alone     Objective:   Physical Exam:  General: Well nourished, well developed, no acute distress  Skin: No lesions  HEENT: Eyes PERRLA, EOM intact, nose patent, throat  non-erythematous erythema right TM    NECK: Supple, no bruits, No JVD, no nodes  Lungs: Clear, no rales, rhonchi, wheezing  Heart: Regular rate and rhythm, no murmurs, gallops, or rubs  Abdomen: flat, bowel sounds positive, no tenderness, or organomegaly  MS:  Pain approximately 2 cm above the right elbow circumferential in 2 cm below the elbow tenderness in the right lateral epicondyle area pain with flexion and extension   Neuro: Alert, CN intact, oriented X 3  Extremities: No cyanosis, clubbing, or edema         Assessment:       1. Allergy, subsequent encounter    2. Encounter for screening mammogram for malignant neoplasm of breast    3. Lateral epicondylitis of right elbow    4. Osteoarthritis of right knee, unspecified osteoarthritis type    5. Restless leg syndrome    6. Tobacco abuse    7. Xanthelasma    8. Morbid obesity with BMI of 40.0-44.9, adult    9. Pure hypercholesterolemia          Plan:       Allergy, subsequent encounter    Encounter for screening mammogram for malignant neoplasm of breast  -     Mammo Digital Screening Bilat w/ Mukesh; Future; Expected date: 04/29/2024    Lateral epicondylitis of right elbow  -     traMADoL (ULTRAM-ER) 100 MG Tb24; Take 1 tablet (100 mg total) by mouth once daily.  Dispense: 30 tablet; Refill: 0    Osteoarthritis of right knee, unspecified osteoarthritis type    Restless leg syndrome    Tobacco abuse    Xanthelasma    Morbid obesity with BMI of 40.0-44.9, adult    Pure hypercholesterolemia    Other orders  -     cyclobenzaprine (FLEXERIL) 10 MG tablet; 1 p.o. q.h.s. p.r.n. left shoulder pain or muscle spasm can increase to t.i.d. if needed but will make sleepy  Dispense: 30 tablet; Refill: 5  -     meloxicam (MOBIC) 7.5 MG tablet; Take 1 tablet (7.5 mg total) by mouth once daily.  Dispense: 60 tablet; Refill: 5  -     azithromycin (Z-LEONEL) 250 MG tablet; 2 tabs by mouth day 1, then 1 tab by mouth daily x 4 days  Dispense: 6 tablet; Refill: 0  -      promethazine-dextromethorphan (PROMETHAZINE-DM) 6.25-15 mg/5 mL Syrp; Take 5 mLs by mouth every 6 (six) hours as needed (cough).  Dispense: 180 mL; Refill: 1  -     predniSONE (DELTASONE) 20 MG tablet; Take 1 tablet (20 mg total) by mouth once daily.  Dispense: 7 tablet; Refill: 0          Main Reason for Visit  Upper respiratory infection--sinusitis/bronchitis--x 2 days alot allergies --zithromax  / pred 20 mg qd x 7 days  Phenergan DM 1 tsp q.6 hours p.r.n. cough if not better in a week will get chest x-ray and possibly Lab  Left shoulder pain--better  --problems now mainly with right knee--patient to start physical therapy next week if not better may need knee replacement  Restless leg syndrome trial of gabapentin 300 mg 1 p.o. q.h.s. do not take with Ultram--legs cramping up waking patient up at night  History migraine headaches on Fioricet--Imitrex Topamax  Hypothyroidism patient on Tapazole  Tobacco abuse Needs to DC smoking  Ankle edema on Lasix 20  History cholecystectomy  History bipolar  Hyperlipidemia on Lipitor   Hx angina on ranexa --has appt DR Carter for echocardiogram  Lab done in June --needs lab CBCs CMP lipids T4 TSH  Health maintenance hepatitis C HIV  Patient claims did lab with Dr. Carter

## 2024-05-08 NOTE — TELEPHONE ENCOUNTER
No care due was identified.  Health [facility] Embedded Care Due Messages. Reference number: [id number].   5/08/2024 5:18:01 PM CDT

## 2024-05-12 RX ORDER — DICLOFENAC SODIUM 10 MG/G
2 GEL TOPICAL 4 TIMES DAILY
Qty: 200 G | Refills: 5 | Status: SHIPPED | OUTPATIENT
Start: 2024-05-12

## 2024-05-15 DIAGNOSIS — M77.11 LATERAL EPICONDYLITIS OF RIGHT ELBOW: ICD-10-CM

## 2024-05-15 NOTE — TELEPHONE ENCOUNTER
No care due was identified.  [facility] Embedded Care Due Messages. Reference number: [id number].   5/15/2024 4:13:10 PM CDT

## 2024-05-15 NOTE — TELEPHONE ENCOUNTER
----- Message from Alison Alvarez sent at 5/15/2024  2:19 PM CDT -----  Contact: [phone]  Requesting an RX refill or new RX.  Is this a refill or new RX: refill  RX name and strength  traMADoL (ULTRAM-ER) 100 MG Tb24  Is this a 30 day or 90 day RX:  Yes, quantity medically necessary  Pharmacy name and phone #  [facility] - [facility] [address] [facility]   Phone: [phone]  Fax: [phone]          patient says Pharmacy never did receive script from 4/29/24 please resend and call patient

## 2024-05-19 RX ORDER — TRAMADOL HYDROCHLORIDE 100 MG/1
100 TABLET, EXTENDED RELEASE ORAL DAILY
Qty: 30 TABLET | Refills: 0 | OUTPATIENT
Start: 2024-05-19

## 2024-05-27 ENCOUNTER — PATIENT MESSAGE (OUTPATIENT)
Dept: ORTHOPEDICS | Facility: CLINIC | Age: 44
End: 2024-05-27
Payer: MEDICAID

## 2024-05-28 ENCOUNTER — PATIENT MESSAGE (OUTPATIENT)
Dept: PRIMARY CARE CLINIC | Facility: CLINIC | Age: 44
End: 2024-05-28
Payer: MEDICAID

## 2024-06-19 ENCOUNTER — TELEPHONE (OUTPATIENT)
Dept: PRIMARY CARE CLINIC | Facility: CLINIC | Age: 44
End: 2024-06-19
Payer: MEDICAID

## 2024-06-19 NOTE — TELEPHONE ENCOUNTER
----- Message from Magaly Venegas sent at 6/18/2024  2:50 PM CDT -----  Contact: [phone] Patient  No blue slot available to schedule an appointment for the patient.  Patient is established with which PCP: Dr ABBEY Montes  Reason for the visit: right ear and knee pain/pt wants to be seen tomorrow  Would the patient like a call back, or a response through their [facility] portal?:  call back

## 2024-06-21 ENCOUNTER — OFFICE VISIT (OUTPATIENT)
Dept: ORTHOPEDICS | Facility: CLINIC | Age: 44
End: 2024-06-21
Payer: MEDICAID

## 2024-06-21 VITALS — HEIGHT: 68 IN | BODY MASS INDEX: 41.87 KG/M2 | WEIGHT: 276.25 LBS

## 2024-06-21 DIAGNOSIS — M25.561 RIGHT KNEE PAIN, UNSPECIFIED CHRONICITY: Primary | ICD-10-CM

## 2024-06-21 PROCEDURE — 99999 PR PBB SHADOW E&M-EST. PATIENT-LVL III: CPT | Mod: PBBFAC,,, | Performed by: ORTHOPAEDIC SURGERY

## 2024-06-21 PROCEDURE — 99213 OFFICE O/P EST LOW 20 MIN: CPT | Mod: PBBFAC,PO | Performed by: ORTHOPAEDIC SURGERY

## 2024-06-21 RX ORDER — AMOXICILLIN AND CLAVULANATE POTASSIUM 875; 125 MG/1; MG/1
1 TABLET, FILM COATED ORAL EVERY 12 HOURS
COMMUNITY
Start: 2024-06-18

## 2024-06-21 RX ORDER — NEOMYCIN SULFATE, POLYMYXIN B SULFATE, HYDROCORTISONE 3.5; 10000; 1 MG/ML; [USP'U]/ML; MG/ML
4 SOLUTION/ DROPS AURICULAR (OTIC) 3 TIMES DAILY
COMMUNITY
Start: 2024-06-20

## 2024-06-21 RX ORDER — KETOROLAC TROMETHAMINE 10 MG/1
10 TABLET, FILM COATED ORAL EVERY 6 HOURS PRN
COMMUNITY
Start: 2024-06-18

## 2024-06-21 RX ORDER — EVOLOCUMAB 140 MG/ML
INJECTION, SOLUTION SUBCUTANEOUS
COMMUNITY

## 2024-06-21 RX ORDER — OMEPRAZOLE 40 MG/1
40 CAPSULE, DELAYED RELEASE ORAL
COMMUNITY
Start: 2024-06-10

## 2024-06-21 RX ORDER — MELOXICAM 7.5 MG/1
7.5 TABLET ORAL
COMMUNITY
Start: 2024-05-29

## 2024-06-21 NOTE — PROGRESS NOTES
44 years old follow-up of her debilitating right knee pain.  Rates pain as 8 on the pain scale     Exam shows tenderness the joint line without signs infection instability    X-rays show arthritic changes     Assessment: Right knee arthrosis     Plan:  Patient states that last injection that we gave her did not provide any relief she is interested in knee replacement surgery.  We will get her set up with nutrition to see if we can get her BMI below 40

## 2024-07-29 ENCOUNTER — OFFICE VISIT (OUTPATIENT)
Dept: PRIMARY CARE CLINIC | Facility: CLINIC | Age: 44
End: 2024-07-29
Payer: MEDICAID

## 2024-07-29 VITALS
SYSTOLIC BLOOD PRESSURE: 136 MMHG | HEART RATE: 76 BPM | HEIGHT: 68 IN | RESPIRATION RATE: 18 BRPM | BODY MASS INDEX: 41.36 KG/M2 | WEIGHT: 272.94 LBS | DIASTOLIC BLOOD PRESSURE: 80 MMHG | OXYGEN SATURATION: 97 %

## 2024-07-29 DIAGNOSIS — M25.561 RIGHT KNEE PAIN, UNSPECIFIED CHRONICITY: ICD-10-CM

## 2024-07-29 DIAGNOSIS — E78.00 PURE HYPERCHOLESTEROLEMIA: ICD-10-CM

## 2024-07-29 DIAGNOSIS — E66.01 MORBID OBESITY WITH BMI OF 40.0-44.9, ADULT: ICD-10-CM

## 2024-07-29 DIAGNOSIS — F31.9 BIPOLAR I DISORDER: ICD-10-CM

## 2024-07-29 DIAGNOSIS — H60.392 OTHER INFECTIVE ACUTE OTITIS EXTERNA OF LEFT EAR: Primary | ICD-10-CM

## 2024-07-29 DIAGNOSIS — M17.11 OSTEOARTHRITIS OF RIGHT KNEE, UNSPECIFIED OSTEOARTHRITIS TYPE: ICD-10-CM

## 2024-07-29 DIAGNOSIS — G43.009 MIGRAINE WITHOUT AURA AND WITHOUT STATUS MIGRAINOSUS, NOT INTRACTABLE: ICD-10-CM

## 2024-07-29 DIAGNOSIS — G25.81 RESTLESS LEG SYNDROME: ICD-10-CM

## 2024-07-29 DIAGNOSIS — M77.11 LATERAL EPICONDYLITIS OF RIGHT ELBOW: ICD-10-CM

## 2024-07-29 PROCEDURE — 3079F DIAST BP 80-89 MM HG: CPT | Mod: CPTII,,, | Performed by: FAMILY MEDICINE

## 2024-07-29 PROCEDURE — 99214 OFFICE O/P EST MOD 30 MIN: CPT | Mod: PBBFAC,PN | Performed by: FAMILY MEDICINE

## 2024-07-29 PROCEDURE — 1159F MED LIST DOCD IN RCRD: CPT | Mod: CPTII,,, | Performed by: FAMILY MEDICINE

## 2024-07-29 PROCEDURE — 99214 OFFICE O/P EST MOD 30 MIN: CPT | Mod: S$PBB,,, | Performed by: FAMILY MEDICINE

## 2024-07-29 PROCEDURE — 3008F BODY MASS INDEX DOCD: CPT | Mod: CPTII,,, | Performed by: FAMILY MEDICINE

## 2024-07-29 PROCEDURE — 99999 PR PBB SHADOW E&M-EST. PATIENT-LVL IV: CPT | Mod: PBBFAC,,, | Performed by: FAMILY MEDICINE

## 2024-07-29 PROCEDURE — 3075F SYST BP GE 130 - 139MM HG: CPT | Mod: CPTII,,, | Performed by: FAMILY MEDICINE

## 2024-07-29 RX ORDER — CEFDINIR 300 MG/1
300 CAPSULE ORAL 2 TIMES DAILY
Qty: 20 CAPSULE | Refills: 0 | Status: SHIPPED | OUTPATIENT
Start: 2024-07-29 | End: 2024-08-08

## 2024-07-29 RX ORDER — CIPROFLOXACIN AND DEXAMETHASONE 3; 1 MG/ML; MG/ML
4 SUSPENSION/ DROPS AURICULAR (OTIC) 2 TIMES DAILY
Qty: 7.5 ML | Refills: 0 | Status: SHIPPED | OUTPATIENT
Start: 2024-07-29

## 2024-07-29 RX ORDER — PREDNISONE 20 MG/1
20 TABLET ORAL DAILY
Qty: 7 TABLET | Refills: 0 | Status: SHIPPED | OUTPATIENT
Start: 2024-07-29 | End: 2024-08-05

## 2024-07-29 RX ORDER — SEMAGLUTIDE 0.68 MG/ML
INJECTION, SOLUTION SUBCUTANEOUS
Qty: 3 EACH | Refills: 3 | Status: SHIPPED | OUTPATIENT
Start: 2024-07-29

## 2024-07-29 RX ORDER — TRAMADOL HYDROCHLORIDE 100 MG/1
100 TABLET, EXTENDED RELEASE ORAL DAILY
Qty: 30 TABLET | Refills: 0 | Status: SHIPPED | OUTPATIENT
Start: 2024-07-29

## 2024-07-29 NOTE — PROGRESS NOTES
Subjective:       Patient ID: Lennie Amor is a 44 y.o. female.    Chief Complaint: Otitis Media      HPI: 45 yo WF in for ear infection--went to urgent care 1 month ago told had an inner and outer ear infection--txed with drops and AB--got a little bit better.  No fever---no runny stuffy nose--no sore throat--slight cough--no phlegm--no drainage from ears.  Pain is in the right ear--no prior ear infections.  No dizziness passing out seizures  No pneumonia or TB had slight bronchospasm in the past smokes half pack of cigarettes per day no wheezing  Patient needs right knee surgery--needs to get BMI under 40--wants to try Ozempic    History hyperlipidemia on atorvastatin and Zetia  History of unstable angina on Ranexa--sees Dr. Carter  History hypothyroidism on Tapazole  History of peripheral edema intermittent Lasix  Right knee pain patient on Ultram/Mobic/Flexeril  History restless leg syndrome on gabapentin                ROS:   Skin: no psoriasis, eczema, skin cancer  HEENT:  +history migraine headache--good since patient since on fioricet ,no  ocular pain, blurred vision, diplopia, epistaxis, hoarseness change in voice, +hypothyroid  Lung: No pneumonia, asthma, Tb, wheezing, SOB, +smoking half pack per day Hx bronchospasm  Heart: No chest pain, +ankle edemaon lasix , no palpitations, MI, peter murmur, hypertens cardiologist Dr. Carter ion,+ hyperlipidemia--no stent bypass arrhythmia Hx angina on ranexa .    Abdomen: No nausea, vomiting, diarrhea, constipation, ulcers, hepatitis,melena, hematochezia, hematemesis + history cholecystectomy  : no UTI, renal disease, stones   GYN LMP 7-  -last mammogram about a year  MS: no fractures, O/A, lupus, rheumatoid, gout history of osteoarthritis history of right knee pain starts rehab May 7, 2024 after completing therapy if no better patient will have a right knee replacement   Neuro: No dizziness, LOC, seizures   No diabetes, no anemia, no anxiety,  no depression     3 children work  lives alone     Objective:   Physical Exam:  General: Well nourished, well developed, no acute distress +morbid obesity  Skin: No lesions  HEENT: Eyes PERRLA, EOM intact, nose patent, throat non-erythematous ear right injected right external canal --TM slightly red  NECK: Supple, no bruits, No JVD, no nodes  Lungs: Clear, no rales, rhonchi, wheezing  Heart: Regular rate and rhythm, no murmurs, gallops, or rubs  Abdomen: flat, bowel sounds positive, no tenderness, or organomegaly  MS:  Pain right knee--supposed to have surgery--needs  Neuro: Alert, CN intact, oriented X 3  Extremities: No cyanosis, clubbing, or edema         Assessment:       1. Other infective acute otitis externa of left ear    2. Pure hypercholesterolemia    3. Right knee pain, unspecified chronicity    4. Bipolar I disorder    5. Migraine without aura and without status migrainosus, not intractable    6. Restless leg syndrome    7. Morbid obesity with BMI of 40.0-44.9, adult    8. Osteoarthritis of right knee, unspecified osteoarthritis type            Plan:       Other infective acute otitis externa of left ear    Pure hypercholesterolemia    Right knee pain, unspecified chronicity    Bipolar I disorder    Migraine without aura and without status migrainosus, not intractable    Restless leg syndrome    Morbid obesity with BMI of 40.0-44.9, adult    Osteoarthritis of right knee, unspecified osteoarthritis type            Main Reason for Visit  Recent right external otitis--1 month ago treated with Augmentin and polymyxin drops---Omnicef 300 mg b.i.d. times 10 days/prednisone 20 mg q.d. x7 days/Ciprodex otic 4 drops affected ear b.i.d.  Morbid obesity---needs to get down to BMI of 40 so able to have right knee surgery--ordered Ozempic 0.25 mg q.week x4 then 0.5 mg q.week--patient told can cause pancreatitis and GI problems if develops so stopped medicine immediately--and can not take with  thyroid cancer  Restless leg syndrome trial of gabapentin 300 mg 1 p.o. q.h.s. do not take with Ultram--legs cramping up waking patient up at night  History migraine headaches on Fioricet-  Hypothyroidism patient on Tapazole--patient needs to discuss treatment with the Ozempic  Tobacco abuse Needs to DC smoking  Ankle edema on Lasix 20  History cholecystectomy  History bipolar  Hyperlipidemia on Repatha and Zetia  Hx angina on ranexa --has appt DR Carter for echocardiogram  Lab done in June --needs lab CBCs CMP lipids T4 TSH  Health maintenance hepatitis C HIV  Keep appt  Dr. Carter

## 2024-07-29 NOTE — TELEPHONE ENCOUNTER
Care Due:                  Date            Visit Type   Department     Provider  --------------------------------------------------------------------------------                                MYCHART                              FOLLOWUP/OF  [facility] [place] [facility]  Last Visit: 07-      FICE VISIT   PRIMARY CARE   Marco A Montes                               -                              [facility]  Next Visit: 09-      CARE ([facility])   PRIMARY CARE   Marco A Montes                                                            Last  Test          Frequency    Reason                     Performed    Due Date  --------------------------------------------------------------------------------    HBA1C.......  6 months...  semaglutide..............  06-   12-    Health [facility] Embedded Care Due Messages. Reference number: [id number].   7/29/2024 12:22:23 PM CDT

## 2024-07-29 NOTE — TELEPHONE ENCOUNTER
Care Due:                  Date            Visit Type   Department     Provider  --------------------------------------------------------------------------------                                MYCHART                              FOLLOWUP/OF  [facility] [place] [place] GREGORIASJORDAN  Last Visit: 07-      FICE VISIT   PRIMARY CARE   Marco A Montes                               -                              PRIMARY [facility]  Next Visit: 10-      CARE ([facility])   PRIMARY CARE   Marco A Montes                                                            Last  Test          Frequency    Reason                     Performed    Due Date  --------------------------------------------------------------------------------    CBC.........  12 months..  diclofenac...............  06-   06-    CMP.........  12 months..  diclofenac, furosemide...  06-   06-    Health [facility] Embedded Care Due Messages. Reference number: [id number].   7/29/2024 11:02:23 AM CDT

## 2024-07-30 RX ORDER — FUROSEMIDE 20 MG/1
20 TABLET ORAL DAILY PRN
Qty: 90 TABLET | Refills: 3 | Status: SHIPPED | OUTPATIENT
Start: 2024-07-30

## 2024-07-30 RX ORDER — CYCLOBENZAPRINE HCL 10 MG
TABLET ORAL
Qty: 30 TABLET | Refills: 5 | Status: SHIPPED | OUTPATIENT
Start: 2024-07-30

## 2024-07-30 NOTE — TELEPHONE ENCOUNTER
Refill Routing Note   Medication(s) are not appropriate for processing by [facility] for the following reason(s):        Required labs outdated  Outside of protocol    [facility] action(s):  Defer  Route     Requires labs : Yes             Appointments  past 12m or future 3m with PCP    Date Provider   Last Visit   4/29/2024 Marco A Montes MD   Next Visit   7/29/2024 Marco A Montes MD   ED visits in past 90 days: 0        Note composed:7:56 AM 07/30/2024

## 2024-08-01 ENCOUNTER — PATIENT MESSAGE (OUTPATIENT)
Dept: PRIMARY CARE CLINIC | Facility: CLINIC | Age: 44
End: 2024-08-01
Payer: MEDICAID

## 2024-08-15 NOTE — TELEPHONE ENCOUNTER
No care due was identified.  [facility] Embedded Care Due Messages. Reference number: [id number].   8/15/2024 9:32:57 AM CDT

## 2024-08-16 RX ORDER — BUTALBITAL, ACETAMINOPHEN AND CAFFEINE 50; 325; 40 MG/1; MG/1; MG/1
TABLET ORAL
Qty: 30 TABLET | Refills: 2 | Status: SHIPPED | OUTPATIENT
Start: 2024-08-16

## 2024-10-03 NOTE — TELEPHONE ENCOUNTER
Care Due:                  Date            Visit Type   Department     Provider  --------------------------------------------------------------------------------                                MYCHART                              [place]  [facility] [place] [facility]  Last Visit: 07-      FICE VISIT   PRIMARY CARE   Marco A Montes  Next Visit: None Scheduled  None         None Found                                                            Last  Test          Frequency    Reason                     Performed    Due Date  --------------------------------------------------------------------------------    CBC.........  12 months..  diclofenac...............  06-   06-    CMP.........  12 months..  diclofenac, furosemide...  06-   06-    [facility] [employer] Embedded Care Due Messages. Reference number: [id number].   10/03/2024 1:04:00 PM CDT

## 2024-10-04 RX ORDER — DICLOFENAC SODIUM 10 MG/G
2 GEL TOPICAL 4 TIMES DAILY
Qty: 200 G | Refills: 3 | Status: SHIPPED | OUTPATIENT
Start: 2024-10-04

## 2024-10-04 NOTE — TELEPHONE ENCOUNTER
Refill Routing Note   Medication(s) are not appropriate for processing by [facility] for the following reason(s):        Outside of protocol    [facility] action(s):  Route     Requires labs : Yes             Appointments  past 12m or future 3m with PCP    Date Provider   Last Visit   7/29/2024 Marco A Montes MD   Next Visit   Visit date not found Marco A Montes MD   ED visits in past 90 days: 0        Note composed:11:04 AM 10/04/2024

## 2024-10-14 RX ORDER — BUTALBITAL, ACETAMINOPHEN AND CAFFEINE 50; 325; 40 MG/1; MG/1; MG/1
TABLET ORAL
Qty: 30 TABLET | Refills: 2 | Status: SHIPPED | OUTPATIENT
Start: 2024-10-14

## 2024-10-14 NOTE — TELEPHONE ENCOUNTER
No care due was identified.  Health [facility] Embedded Care Due Messages. Reference number: [id number].   10/14/2024 10:58:49 AM CDT

## 2025-01-08 RX ORDER — CYCLOBENZAPRINE HCL 10 MG
TABLET ORAL
Qty: 30 TABLET | Refills: 5 | Status: SHIPPED | OUTPATIENT
Start: 2025-01-08

## 2025-01-08 NOTE — TELEPHONE ENCOUNTER
Care Due:                  Date            Visit Type   Department     Provider  --------------------------------------------------------------------------------                                MYCHART                              [place]  [facility] [place] [facility] [place] [facility]  Last Visit: 07-      FICE VISIT   PRIMARY CARE   Marco A Montes  Next Visit: None Scheduled  None         None Found                                                            Last  Test          Frequency    Reason                     Performed    Due Date  --------------------------------------------------------------------------------    CBC.........  12 months..  diclofenac...............  06-   06-    CMP.........  12 months..  diclofenac, furosemide...  06-   06-    [facility] [employer] Embedded Care Due Messages. Reference number: [id number].   1/08/2025 2:21:04 PM CST

## 2025-01-10 NOTE — TELEPHONE ENCOUNTER
No care due was identified.  Health [facility] Embedded Care Due Messages. Reference number: [id number].   1/10/2025 11:55:05 AM CST

## 2025-01-13 RX ORDER — BUTALBITAL, ACETAMINOPHEN AND CAFFEINE 50; 325; 40 MG/1; MG/1; MG/1
TABLET ORAL
Qty: 30 TABLET | Refills: 2 | Status: SHIPPED | OUTPATIENT
Start: 2025-01-13

## 2025-02-10 NOTE — TELEPHONE ENCOUNTER
No care due was identified.  [facility] Embedded Care Due Messages. Reference number: [id number].   2/10/2025 12:31:39 PM CST

## 2025-02-11 RX ORDER — DICLOFENAC SODIUM 10 MG/G
2 GEL TOPICAL 4 TIMES DAILY
Qty: 200 G | Refills: 3 | Status: SHIPPED | OUTPATIENT
Start: 2025-02-11

## 2025-03-10 ENCOUNTER — PATIENT MESSAGE (OUTPATIENT)
Dept: PRIMARY CARE CLINIC | Facility: CLINIC | Age: 45
End: 2025-03-10
Payer: MEDICAID

## 2025-03-11 ENCOUNTER — OFFICE VISIT (OUTPATIENT)
Dept: OPHTHALMOLOGY | Facility: CLINIC | Age: 45
End: 2025-03-11
Payer: MEDICAID

## 2025-03-11 DIAGNOSIS — H11.152 PINGUECULA OF LEFT EYE: Primary | ICD-10-CM

## 2025-03-11 PROCEDURE — 1160F RVW MEDS BY RX/DR IN RCRD: CPT | Mod: CPTII,,, | Performed by: OPHTHALMOLOGY

## 2025-03-11 PROCEDURE — 99999 PR PBB SHADOW E&M-EST. PATIENT-LVL III: CPT | Mod: PBBFAC,,, | Performed by: OPHTHALMOLOGY

## 2025-03-11 PROCEDURE — 99213 OFFICE O/P EST LOW 20 MIN: CPT | Mod: PBBFAC | Performed by: OPHTHALMOLOGY

## 2025-03-11 PROCEDURE — 99203 OFFICE O/P NEW LOW 30 MIN: CPT | Mod: S$PBB,,, | Performed by: OPHTHALMOLOGY

## 2025-03-11 PROCEDURE — 1159F MED LIST DOCD IN RCRD: CPT | Mod: CPTII,,, | Performed by: OPHTHALMOLOGY

## 2025-03-11 RX ORDER — KETOROLAC TROMETHAMINE 5 MG/ML
1 SOLUTION OPHTHALMIC 4 TIMES DAILY
Qty: 5 ML | Refills: 0 | Status: SHIPPED | OUTPATIENT
Start: 2025-03-11 | End: 2026-03-11

## 2025-03-11 NOTE — PROGRESS NOTES
HPI    Patient is here today for a urgent care appointment. Last seen on   03/10/2025 at [facility] ED. Patient states symptoms started on 3/7/25.   Left eye feel scratchy whenever she blinks. Has noticed some vision   changes this morning. No pain, but occasional itching.    Eye Meds: Erythromycin OS     Last edited by Eloise Wesley on 3/11/2025  8:51 AM.            Assessment /Plan     For exam results, see Encounter Report.    Pinguecula of left eye    Other orders  -     ketorolac 0.5% (ACULAR) 0.5 % Drop; Place 1 drop into the left eye 4 (four) times daily.  Dispense: 5 mL; Refill: 0      Inflammed, OS  Ketorolac tid

## 2025-04-07 RX ORDER — BUTALBITAL, ACETAMINOPHEN AND CAFFEINE 50; 325; 40 MG/1; MG/1; MG/1
TABLET ORAL
Qty: 30 TABLET | Refills: 2 | OUTPATIENT
Start: 2025-04-07

## 2025-04-07 NOTE — TELEPHONE ENCOUNTER
Care Due:                  Date            Visit Type   Department     Provider  --------------------------------------------------------------------------------                                MYCHART                              FOLLOWUP/OF  [facility] [place] [facility]  Last Visit: 07-      FICE VISIT   PRIMARY CARE   Marco A Montes  Next Visit: None Scheduled  None         None Found                                                            Last  Test          Frequency    Reason                     Performed    Due Date  --------------------------------------------------------------------------------    CBC.........  12 months..  diclofenac...............  06-   06-    CMP.........  12 months..  diclofenac, furosemide...  06-   06-    [facility] [employer] Embedded Care Due Messages. Reference number: [id number].   4/07/2025 10:31:39 AM CDT

## 2025-04-30 ENCOUNTER — OFFICE VISIT (OUTPATIENT)
Dept: PRIMARY CARE CLINIC | Facility: CLINIC | Age: 45
End: 2025-04-30
Payer: MEDICAID

## 2025-04-30 VITALS
DIASTOLIC BLOOD PRESSURE: 88 MMHG | SYSTOLIC BLOOD PRESSURE: 134 MMHG | HEART RATE: 68 BPM | WEIGHT: 266.31 LBS | BODY MASS INDEX: 40.36 KG/M2 | OXYGEN SATURATION: 98 % | RESPIRATION RATE: 18 BRPM | HEIGHT: 68 IN

## 2025-04-30 DIAGNOSIS — M54.32 SCIATICA OF LEFT SIDE: ICD-10-CM

## 2025-04-30 DIAGNOSIS — M17.11 OSTEOARTHRITIS OF RIGHT KNEE, UNSPECIFIED OSTEOARTHRITIS TYPE: ICD-10-CM

## 2025-04-30 DIAGNOSIS — E78.00 PURE HYPERCHOLESTEROLEMIA: ICD-10-CM

## 2025-04-30 DIAGNOSIS — M54.9 DORSALGIA, UNSPECIFIED: ICD-10-CM

## 2025-04-30 DIAGNOSIS — E66.01 MORBID OBESITY WITH BMI OF 40.0-44.9, ADULT: ICD-10-CM

## 2025-04-30 DIAGNOSIS — F31.9 BIPOLAR I DISORDER: ICD-10-CM

## 2025-04-30 DIAGNOSIS — S39.012A SACROILIAC STRAIN, INITIAL ENCOUNTER: Primary | ICD-10-CM

## 2025-04-30 PROCEDURE — 96372 THER/PROPH/DIAG INJ SC/IM: CPT | Mod: PBBFAC,PN

## 2025-04-30 PROCEDURE — 99999PBSHW PR PBB SHADOW TECHNICAL ONLY FILED TO HB: Mod: PBBFAC,,,

## 2025-04-30 PROCEDURE — 1159F MED LIST DOCD IN RCRD: CPT | Mod: CPTII,,, | Performed by: FAMILY MEDICINE

## 2025-04-30 PROCEDURE — 99214 OFFICE O/P EST MOD 30 MIN: CPT | Mod: PBBFAC,PN | Performed by: FAMILY MEDICINE

## 2025-04-30 PROCEDURE — 3079F DIAST BP 80-89 MM HG: CPT | Mod: CPTII,,, | Performed by: FAMILY MEDICINE

## 2025-04-30 PROCEDURE — 99999 PR PBB SHADOW E&M-EST. PATIENT-LVL IV: CPT | Mod: PBBFAC,,, | Performed by: FAMILY MEDICINE

## 2025-04-30 PROCEDURE — 99214 OFFICE O/P EST MOD 30 MIN: CPT | Mod: S$PBB,,, | Performed by: FAMILY MEDICINE

## 2025-04-30 PROCEDURE — 3075F SYST BP GE 130 - 139MM HG: CPT | Mod: CPTII,,, | Performed by: FAMILY MEDICINE

## 2025-04-30 PROCEDURE — 3008F BODY MASS INDEX DOCD: CPT | Mod: CPTII,,, | Performed by: FAMILY MEDICINE

## 2025-04-30 RX ORDER — MELOXICAM 7.5 MG/1
TABLET ORAL
Qty: 60 TABLET | Refills: 5 | Status: SHIPPED | OUTPATIENT
Start: 2025-04-30 | End: 2025-04-30

## 2025-04-30 RX ORDER — HYDROCODONE BITARTRATE AND ACETAMINOPHEN 5; 325 MG/1; MG/1
1 TABLET ORAL EVERY 6 HOURS PRN
Qty: 30 TABLET | Refills: 0 | Status: SHIPPED | OUTPATIENT
Start: 2025-04-30

## 2025-04-30 RX ORDER — MELOXICAM 7.5 MG/1
TABLET ORAL
Qty: 60 TABLET | Refills: 5 | Status: SHIPPED | OUTPATIENT
Start: 2025-04-30

## 2025-04-30 RX ORDER — BUTALBITAL, ACETAMINOPHEN AND CAFFEINE 50; 325; 40 MG/1; MG/1; MG/1
TABLET ORAL
Qty: 30 TABLET | Refills: 2 | Status: SHIPPED | OUTPATIENT
Start: 2025-04-30

## 2025-04-30 RX ORDER — TRIAMCINOLONE ACETONIDE 40 MG/ML
40 INJECTION, SUSPENSION INTRA-ARTICULAR; INTRAMUSCULAR ONCE
Status: COMPLETED | OUTPATIENT
Start: 2025-04-30 | End: 2025-04-30

## 2025-04-30 RX ORDER — PREDNISONE 20 MG/1
TABLET ORAL
Qty: 10 TABLET | Refills: 0 | Status: SHIPPED | OUTPATIENT
Start: 2025-04-30

## 2025-04-30 RX ORDER — BUTALBITAL, ACETAMINOPHEN AND CAFFEINE 50; 325; 40 MG/1; MG/1; MG/1
TABLET ORAL
Qty: 30 TABLET | Refills: 2 | Status: SHIPPED | OUTPATIENT
Start: 2025-04-30 | End: 2025-04-30

## 2025-04-30 RX ADMIN — TRIAMCINOLONE ACETONIDE 40 MG: 40 INJECTION, SUSPENSION INTRA-ARTICULAR; INTRAMUSCULAR at 10:04

## 2025-04-30 NOTE — ADDENDUM NOTE
Addended by: MAG ACOSTA on: 4/30/2025 10:07 AM     Modules accepted: Orders    
No indicators present

## 2025-04-30 NOTE — PROGRESS NOTES
Subjective:       Patient ID: Lennie Amor is a 44 y.o. female.    Chief Complaint: Annual Exam, Medication Refill, and ER Follow Up       HPI:  44-year-old white female in for annual physical exam refills in ER follow-up  Patient seen emergency room [id number]---patient with pain in the left hip--told secondary to sciatica---was 2:00 a.m.--told to late to do anything did not get x-rays no Lab  Problems with lower back left hip and buttock x2 months---no trauma--no excessive activity--no lupus rheumatoid gout---no history of prior back pain  States pain so severe unable to lay on the left side anymore  History of problems with right knee in the past  Hyperlipidemia on atorvastatin and Zetia  History of unstable angina on Ranexa--sees Dr. Carter  Hyperthyroid on Tapazole  Peripheral edema intermittent Lasix  Right knee pain patient on Ultram/Mobic/Flexeril  History restless leg syndrome on gabapentin                ROS:   Skin: no psoriasis, eczema, skin cancer  HEENT:  +history migraine headache--good since patient since on fioricet ,no  ocular pain, blurred vision, diplopia, epistaxis, hoarseness change in voice, +hypothyroid  Lung: No pneumonia, asthma, Tb, wheezing, SOB, +smoking half pack per day Hx bronchospasm  Heart: No chest pain, +ankle edemaon lasix , no palpitations, MI, peter murmur, hypertens cardiologist Dr. Carter ion,+ hyperlipidemia--no stent bypass arrhythmia Hx angina on ranexa .    Abdomen: No nausea, vomiting, diarrhea, constipation, ulcers, hepatitis,melena, hematochezia, hematemesis + history cholecystectomy  : no UTI, renal disease, stones   GYN LMP 7-  -last mammogram about a year  MS: no fractures, O/A, lupus, rheumatoid, gout history of osteoarthritis history of right knee sees orthopedist in [place] told will not do knee surgery till BMI is less than 40--back pain with SI pain left hip pain   Neuro: No dizziss, LOC, seizures   No diabetes, no anemia, no anxiety, no  depression     3 children work  lives alone     Objective:   Physical Exam:  General: Well nourished, well developed, no acute distress +morbid obesity  Skin: No lesions  HEENT: Eyes PERRLA, EOM intact, nose patent, throat non-erythematous ear right injected right external canal --TM slightly red  NECK: Supple, no bruits, No JVD, no nodes  Lungs: Clear, no rales, rhonchi, wheezing  Heart: Regular rate and rhythm, no murmurs, gallops, or rubs  Abdomen: flat, bowel sounds positive, no tenderness, or organomegaly  MS:  Pain right knee--patient told needs knee surgery but orthopedist in [place] will not do knee surgery until BMI 40 or less--main problem today is lumbosacral strain with sacroiliac strain with sciatica from left hip to just above the left knee laterally  Neuro: Alert, CN intact, oriented X 3  Extremities: No cyanosis, clubbing, or edema         Assessment:       1. Sacroiliac strain, initial encounter    2. Sciatica of left side    3. Bipolar I disorder    4. Pure hypercholesterolemia    5. Morbid obesity with BMI of 40.0-44.9, adult    6. Osteoarthritis of right knee, unspecified osteoarthritis type    7. Dorsalgia, unspecified              Plan:       Sacroiliac strain, initial encounter  -     X-Ray Lumbar Spine 5 View; Future; Expected date: 04/30/2025  -     X-Ray Hip 2 or 3 views Left with Pelvis when performed; Future; Expected date: 04/30/2025  -     HYDROcodone-acetaminophen (NORCO) 5-325 mg per tablet; Take 1 tablet by mouth every 6 (six) hours as needed.  Dispense: 30 tablet; Refill: 0  -     PT evaluate and treat; Future; Expected date: 04/30/2025    Sciatica of left side  -     X-Ray Lumbar Spine 5 View; Future; Expected date: 04/30/2025  -     X-Ray Hip 2 or 3 views Left with Pelvis when performed; Future; Expected date: 04/30/2025  -     HYDROcodone-acetaminophen (NORCO) 5-325 mg per tablet; Take 1 tablet by mouth every 6 (six) hours as needed.  Dispense: 30 tablet;  Refill: 0  -     PT evaluate and treat; Future; Expected date: 04/30/2025    Bipolar I disorder    Pure hypercholesterolemia    Morbid obesity with BMI of 40.0-44.9, adult    Osteoarthritis of right knee, unspecified osteoarthritis type    Dorsalgia, unspecified  -     MRI Lumbar Spine Without Contrast; Future; Expected date: 04/30/2025    Other orders  -     butalbital-acetaminophen-caffeine -40 mg (FIORICET, ESGIC) -40 mg per tablet; TAKE ONE BY MOUTH EVERY DAY IF NEEDED FOR HEADACHE  Dispense: 30 tablet; Refill: 2  -     triamcinolone acetonide injection 40 mg  -     predniSONE (DELTASONE) 20 MG tablet; Start Thursday a.m. 1 p.o. q.d. for back pain do not take with NSAIDs can take with Tylenol  Dispense: 10 tablet; Refill: 0  -     meloxicam (MOBIC) 7.5 MG tablet; Start after prednisone dose complete do not take with any other NSAIDs can take with Tylenol  Dispense: 60 tablet; Refill: 5              Main Reason for Visit  Left sacroiliac strain--left hip pain--with sciatica buttock to left upper leg to just above the knee---x-ray lumbar spine x-ray left hip--MRI lumbar spine---Moist heat/theragesic/range of motion exercise--Kenalog 40 mg--prednisone 20 mg q.d. times 10 days then Mobic 7.5 mg b.i.d. can you Flexeril at bedtime  Osteoarthritis right knee--sees orthopedic in [place] told needs knee surgery but will not do needs surgery until BMI is less than 40  Other medical issues  Morbid obesity---needs to get down to BMI of 40 so able to have right knee surgery--ordered Ozempic 0.25 mg q.week x4 then 0.5 mg q.week--patient told can cause pancreatitis and GI problems if develops so stopped medicine immediately--and can not take with thyroid cancer  Restless leg syndrome trial of gabapentin 300 mg 1 p.o. q.h.s. do not take with Ultram--legs cramping up waking patient up at night  History migraine headaches on Fioricet-  Hypothyroidism patient on Tapazole--patient needs to discuss treatment with the  Ozempic  Tobacco abuse Needs to DC smoking  Ankle edema on Lasix 20  History cholecystectomy  History bipolar  Hyperlipidemia on Repatha and Zetia  Hx angina on ranexa --has appt DR Carter for echocardiogram  Lab  --needs lab CBCs CMP lipid hemoglobin A1c  Keep appt  Dr. Carter

## 2025-04-30 NOTE — PROGRESS NOTES
Verified pt ID using name and . NDKA. Administered kenalog 40 in right VG per physician order using aseptic technique. Aspirated and no blood return noted. Pt tolerated well with no adverse reactions noted.

## 2025-05-03 ENCOUNTER — RESULTS FOLLOW-UP (OUTPATIENT)
Dept: PRIMARY CARE CLINIC | Facility: CLINIC | Age: 45
End: 2025-05-03
Payer: MEDICAID

## 2025-05-03 DIAGNOSIS — S39.012D LUMBOSACRAL STRAIN, SUBSEQUENT ENCOUNTER: Primary | ICD-10-CM

## 2025-05-12 ENCOUNTER — TELEPHONE (OUTPATIENT)
Dept: PRIMARY CARE CLINIC | Facility: CLINIC | Age: 45
End: 2025-05-12
Payer: MEDICAID

## 2025-05-12 NOTE — TELEPHONE ENCOUNTER
Left detailed voice message to let patient know about the PT and that he has referred her to Neurosurgery to see if they can get MRI approved.  I see in previous message that she is scheduled with PT on 5/15

## 2025-07-02 DIAGNOSIS — Z12.31 OTHER SCREENING MAMMOGRAM: ICD-10-CM

## 2025-07-30 NOTE — TELEPHONE ENCOUNTER
Care Due:                  Date            Visit Type   Department     Provider  --------------------------------------------------------------------------------                                EP -                              PR[place]      [facility] [place] [facility]  Last Visit: 04-      CARE ([facility])   PRIMARY CARE   Marco A Montes  Next Visit: None Scheduled  None         None Found                                                            Last  Test          Frequency    Reason                     Performed    Due Date  --------------------------------------------------------------------------------    CBC.........  12 months..  meloxicam................  Not Found    Overdue    CMP.........  12 months..  furosemide, meloxicam....  Not Found    Overdue    [employer] Embedded Care Due Messages. Reference number: [id number].   7/30/2025 9:37:50 AM CDT

## 2025-07-31 NOTE — TELEPHONE ENCOUNTER
Refill Routing Note   Medication(s) are not appropriate for processing by [facility] for the following reason(s):        Outside of protocol:PRN USE    [facility] action(s):  Route   Requires labs : Yes      Medication Therapy Plan: PRN USE FOR FUROSEMIDE IS OUTSIDE OF PROTOCOL.      Appointments  past 12m or future 3m with PCP    Date Provider   Last Visit   4/30/2025 Marco A Montes MD   Next Visit   Visit date not found Marco A Montes MD   ED visits in past 90 days: 0        Note composed:9:35 PM 07/30/2025

## 2025-08-03 RX ORDER — FUROSEMIDE 20 MG/1
20 TABLET ORAL DAILY PRN
Qty: 90 TABLET | Refills: 2 | Status: SHIPPED | OUTPATIENT
Start: 2025-08-03